# Patient Record
Sex: FEMALE | Race: WHITE | NOT HISPANIC OR LATINO
[De-identification: names, ages, dates, MRNs, and addresses within clinical notes are randomized per-mention and may not be internally consistent; named-entity substitution may affect disease eponyms.]

---

## 2019-10-15 ENCOUNTER — RX RENEWAL (OUTPATIENT)
Age: 69
End: 2019-10-15

## 2019-10-15 PROBLEM — Z00.00 ENCOUNTER FOR PREVENTIVE HEALTH EXAMINATION: Status: ACTIVE | Noted: 2019-10-15

## 2019-10-15 RX ORDER — MECLIZINE HYDROCHLORIDE 25 MG/1
25 TABLET ORAL
Qty: 180 | Refills: 1 | Status: ACTIVE | COMMUNITY
Start: 2019-10-15 | End: 1900-01-01

## 2020-09-21 ENCOUNTER — APPOINTMENT (OUTPATIENT)
Dept: OTOLARYNGOLOGY | Facility: CLINIC | Age: 70
End: 2020-09-21

## 2020-09-25 ENCOUNTER — APPOINTMENT (OUTPATIENT)
Dept: OTOLARYNGOLOGY | Facility: CLINIC | Age: 70
End: 2020-09-25
Payer: SELF-PAY

## 2020-09-25 PROCEDURE — 92592: CPT | Mod: NC

## 2020-10-09 ENCOUNTER — APPOINTMENT (OUTPATIENT)
Dept: OTOLARYNGOLOGY | Facility: CLINIC | Age: 70
End: 2020-10-09
Payer: SELF-PAY

## 2020-10-09 PROCEDURE — 92593: CPT | Mod: NC

## 2020-10-09 PROCEDURE — V5014: CPT

## 2020-10-23 ENCOUNTER — APPOINTMENT (OUTPATIENT)
Dept: OTOLARYNGOLOGY | Facility: CLINIC | Age: 70
End: 2020-10-23
Payer: SELF-PAY

## 2020-10-23 DIAGNOSIS — Z46.1 ENCOUNTER FOR FITTING AND ADJUSTMENT OF HEARING AID: ICD-10-CM

## 2020-10-23 PROCEDURE — 92593: CPT | Mod: NC

## 2021-08-06 ENCOUNTER — APPOINTMENT (OUTPATIENT)
Dept: UROLOGY | Facility: CLINIC | Age: 71
End: 2021-08-06
Payer: COMMERCIAL

## 2021-08-06 VITALS
TEMPERATURE: 97.8 F | DIASTOLIC BLOOD PRESSURE: 80 MMHG | HEIGHT: 63 IN | HEART RATE: 78 BPM | BODY MASS INDEX: 20.91 KG/M2 | SYSTOLIC BLOOD PRESSURE: 145 MMHG | WEIGHT: 118 LBS

## 2021-08-06 PROCEDURE — 99203 OFFICE O/P NEW LOW 30 MIN: CPT

## 2021-08-06 NOTE — HISTORY OF PRESENT ILLNESS
[FreeTextEntry1] : 69 YO F seen TODAY 8/6/2021 as NPT. She comes for interval FU of a previously treated TCC bladder. She reports normal voiding with no dysuria or gross hematuria. No interval symptomatic UTIs.\par \par Patient seen 2/20/2018 for annual BTFU cystoscopy for low grade superficial TCC removed 7/1/2010. FISH negative at that time.\par \par UA trace RBCs, trace WBCs.\par \par PAtient remains under care for her TN and Rheumatoid Arthritis and is now in IV Rx for the latter. she is allergic to Sulfa drugs.\par \par  The patient denies fevers, chills, nausea and or vomiting and no unexplained weight loss. \par All pertinent parts of the patient PFSH (past medical, family and social histories), laboratory, radiological studies and physician notes were reviewed prior to starting the face to face portion of the  visit. Questionnaire results were discussed with patient.\par

## 2021-08-06 NOTE — ASSESSMENT
[FreeTextEntry1] : We discussed interval reassessment for her previous bladder cancer and I sent urine for culture and cytology and FISH test today.  I recommended we wait for the results of the culture before proceeding to cystoscopy in view of the white blood cells found in the urinalysis today.  Reschedule cystoscopy for next week tentatively. Swati Arciniega MD\par

## 2021-08-06 NOTE — LETTER BODY
[Dear  ___] : Dear  [unfilled], [Consult Letter:] : I had the pleasure of evaluating your patient, [unfilled]. [Please see my note below.] : Please see my note below. [Consult Closing:] : Thank you very much for allowing me to participate in the care of this patient.  If you have any questions, please do not hesitate to contact me. [FreeTextEntry3] : Best Regards, \par \par Swati Arciniega MD\par

## 2021-08-06 NOTE — PHYSICAL EXAM
[General Appearance - Well Developed] : well developed [Normal Appearance] : normal appearance [General Appearance - Well Nourished] : well nourished [Well Groomed] : well groomed [General Appearance - In No Acute Distress] : no acute distress [Edema] : no peripheral edema [] : no respiratory distress [Abdomen Soft] : soft [Abdomen Tenderness] : non-tender [Costovertebral Angle Tenderness] : no ~M costovertebral angle tenderness [Normal Station and Gait] : the gait and station were normal for the patient's age [Skin Turgor] : supple [No Focal Deficits] : no focal deficits [Oriented To Time, Place, And Person] : oriented to person, place, and time [Affect] : the affect was normal [Mood] : the mood was normal [Not Anxious] : not anxious

## 2021-08-09 LAB — BACTERIA UR CULT: NORMAL

## 2021-08-11 ENCOUNTER — NON-APPOINTMENT (OUTPATIENT)
Age: 71
End: 2021-08-11

## 2021-08-11 LAB
HLX UV FISH FINAL REPORT: NORMAL
URINE CYTOLOGY: NORMAL

## 2021-08-18 ENCOUNTER — APPOINTMENT (OUTPATIENT)
Dept: UROLOGY | Facility: CLINIC | Age: 71
End: 2021-08-18

## 2021-08-18 ENCOUNTER — APPOINTMENT (OUTPATIENT)
Dept: UROLOGY | Facility: CLINIC | Age: 71
End: 2021-08-18
Payer: COMMERCIAL

## 2021-08-18 VITALS
SYSTOLIC BLOOD PRESSURE: 131 MMHG | OXYGEN SATURATION: 100 % | HEART RATE: 80 BPM | DIASTOLIC BLOOD PRESSURE: 79 MMHG | TEMPERATURE: 97.3 F

## 2021-08-18 PROCEDURE — 99213 OFFICE O/P EST LOW 20 MIN: CPT | Mod: 25

## 2021-08-18 PROCEDURE — 52000 CYSTOURETHROSCOPY: CPT

## 2021-08-24 ENCOUNTER — NON-APPOINTMENT (OUTPATIENT)
Age: 71
End: 2021-08-24

## 2021-09-01 ENCOUNTER — OUTPATIENT (OUTPATIENT)
Dept: OUTPATIENT SERVICES | Facility: HOSPITAL | Age: 71
LOS: 1 days | End: 2021-09-01
Payer: COMMERCIAL

## 2021-09-01 ENCOUNTER — APPOINTMENT (OUTPATIENT)
Dept: CT IMAGING | Facility: HOSPITAL | Age: 71
End: 2021-09-01
Payer: COMMERCIAL

## 2021-09-01 PROCEDURE — 74178 CT ABD&PLV WO CNTR FLWD CNTR: CPT

## 2021-09-01 PROCEDURE — 74178 CT ABD&PLV WO CNTR FLWD CNTR: CPT | Mod: 26,MH

## 2021-09-02 ENCOUNTER — TRANSCRIPTION ENCOUNTER (OUTPATIENT)
Age: 71
End: 2021-09-02

## 2021-09-02 LAB — SARS-COV-2 N GENE NPH QL NAA+PROBE: NOT DETECTED

## 2021-09-03 ENCOUNTER — OUTPATIENT (OUTPATIENT)
Dept: OUTPATIENT SERVICES | Facility: HOSPITAL | Age: 71
LOS: 1 days | Discharge: HOME | End: 2021-09-03
Payer: COMMERCIAL

## 2021-09-03 ENCOUNTER — RESULT REVIEW (OUTPATIENT)
Age: 71
End: 2021-09-03

## 2021-09-03 ENCOUNTER — APPOINTMENT (OUTPATIENT)
Dept: UROLOGY | Facility: AMBULATORY SURGERY CENTER | Age: 71
End: 2021-09-03

## 2021-09-03 PROCEDURE — 88305 TISSUE EXAM BY PATHOLOGIST: CPT | Mod: 26

## 2021-09-03 PROCEDURE — 52235 CYSTOSCOPY AND TREATMENT: CPT

## 2021-09-03 RX ORDER — CEPHALEXIN 500 MG
1 CAPSULE ORAL
Qty: 9 | Refills: 0
Start: 2021-09-03 | End: 2021-09-05

## 2021-09-07 LAB — SURGICAL PATHOLOGY STUDY: SIGNIFICANT CHANGE UP

## 2021-09-10 ENCOUNTER — APPOINTMENT (OUTPATIENT)
Dept: UROLOGY | Facility: CLINIC | Age: 71
End: 2021-09-10
Payer: COMMERCIAL

## 2021-09-10 VITALS — DIASTOLIC BLOOD PRESSURE: 85 MMHG | SYSTOLIC BLOOD PRESSURE: 178 MMHG | TEMPERATURE: 97.9 F | HEART RATE: 82 BPM

## 2021-09-10 PROCEDURE — 99214 OFFICE O/P EST MOD 30 MIN: CPT

## 2021-09-10 NOTE — LETTER BODY
[Dear  ___] : Dear  [unfilled], [Courtesy Letter:] : I had the pleasure of seeing your patient, [unfilled], in my office today. [Please see my note below.] : Please see my note below. [Consult Closing:] : Thank you very much for allowing me to participate in the care of this patient.  If you have any questions, please do not hesitate to contact me. [FreeTextEntry3] : Best Regards, \par \par Swati Arciniega MD\par

## 2021-09-10 NOTE — HISTORY OF PRESENT ILLNESS
[FreeTextEntry1] : 69 YO F seen  8/6/2021 as NPT. She comes for interval FU of a previously treated TCC bladder. She reports normal voiding with no dysuria or gross hematuria. No interval symptomatic UTIs.\par \par Patient seen 2/20/2018 for annual BTFU cystoscopy for low grade superficial TCC removed 7/1/2010. FISH negative at that time.\par \par UA trace RBCs, trace WBCs. Urine tests sent and cystoscopy scheduled.\par \par Patient seen  8/18/2021 for cystoscopy.\par urine C+S negative\par urine cytology negative\par FISH negative.\par Cystoscopy revealed recurrence left lateral wall multifocal TCC.\par PAtient underwent TURBT 9/3/2021 at [facility]. PAt superficial, non-invasive TCC low grade with 5% high grade. No muscularis propria noted. \par \par Patient seen TODAY 9/10/2021 for assessment of her post op course, review of pathology ad plan further testing and treatment. She told me that she is voiding well with no gross hematuria but still has mild pelvic discomfort. \par \par Pathology report reviewed which showed non-invasive TCC predominantly low grade with 5% high grade. lamina propria not invaded, no muscularis propria identified, even on deep specimen.\par UA Nod RBC, MOd WBC, Nitrite negative. \par \par Patient remains under care for her HTN and Rheumatoid Arthritis and is now in IV Rx for the latter. she is allergic to Sulfa drugs.\par \par  The patient denies fevers, chills, nausea and or vomiting and no unexplained weight loss. \par All pertinent parts of the patient PFSH (past medical, family and social histories), laboratory, radiological studies and physician notes were reviewed prior to starting the face to face portion of the  visit. Questionnaire results were discussed with patient.\par

## 2021-09-10 NOTE — ASSESSMENT
[FreeTextEntry1] : We discussed the results of the pathology report in detail including the differences between invasive and noninvasive bladder cancer we also discussed the low grade and high-grade bladder tumors in general and in regard to her pathology report.  I recommended that we proceed with a recystoscopy biopsy fulguration and mitomycin-C instillation 6 weeks after the initial procedure.  We discussed the indications risks alternatives and chances for success of this procedure and she consented procedure was scheduled for October 15.  I also recommended that we plan to follow-up approximately 4 weeks after that procedure with instillation of BCG full course of 6 weekly instillations followed by installations every 3 months and interval cystoscopy and possible biopsy on a 3-month basis for the next 2 years.  We discussed the risks alternatives chance for success and indications of BCG therapy especially in treating superficial high-grade TCC of the bladder.  All patient's questions were answered in detail.

## 2021-09-13 LAB — BACTERIA UR CULT: NORMAL

## 2021-09-15 ENCOUNTER — RESULT CHARGE (OUTPATIENT)
Age: 71
End: 2021-09-15

## 2021-09-15 LAB
BILIRUB UR QL STRIP: NORMAL
CLARITY UR: CLEAR
COLLECTION METHOD: NORMAL
GLUCOSE UR-MCNC: NORMAL
HCG UR QL: 1 EU/DL
HGB UR QL STRIP.AUTO: NORMAL
KETONES UR-MCNC: NORMAL
LEUKOCYTE ESTERASE UR QL STRIP: NORMAL
NITRITE UR QL STRIP: NORMAL
PH UR STRIP: 8
PROT UR STRIP-MCNC: 30
SP GR UR STRIP: 1.01

## 2021-09-16 ENCOUNTER — RESULT CHARGE (OUTPATIENT)
Age: 71
End: 2021-09-16

## 2021-09-16 RX ORDER — PHENAZOPYRIDINE 200 MG/1
200 TABLET, FILM COATED ORAL 3 TIMES DAILY
Qty: 9 | Refills: 0 | Status: ACTIVE | COMMUNITY
Start: 2021-09-16 | End: 1900-01-01

## 2021-09-24 RX ORDER — AMPICILLIN 500 MG/1
500 CAPSULE ORAL
Qty: 14 | Refills: 0 | Status: ACTIVE | COMMUNITY
Start: 2021-09-24 | End: 1900-01-01

## 2021-11-01 LAB — SARS-COV-2 N GENE NPH QL NAA+PROBE: NOT DETECTED

## 2021-11-02 ENCOUNTER — TRANSCRIPTION ENCOUNTER (OUTPATIENT)
Age: 71
End: 2021-11-02

## 2021-11-03 ENCOUNTER — RESULT REVIEW (OUTPATIENT)
Age: 71
End: 2021-11-03

## 2021-11-03 ENCOUNTER — OUTPATIENT (OUTPATIENT)
Dept: OUTPATIENT SERVICES | Facility: HOSPITAL | Age: 71
LOS: 1 days | Discharge: HOME | End: 2021-11-03
Payer: COMMERCIAL

## 2021-11-03 ENCOUNTER — APPOINTMENT (OUTPATIENT)
Dept: UROLOGY | Facility: AMBULATORY SURGERY CENTER | Age: 71
End: 2021-11-03

## 2021-11-03 PROCEDURE — 51720 TREATMENT OF BLADDER LESION: CPT | Mod: 59

## 2021-11-03 PROCEDURE — 52214 CYSTOSCOPY AND TREATMENT: CPT

## 2021-11-03 PROCEDURE — 88305 TISSUE EXAM BY PATHOLOGIST: CPT | Mod: 26

## 2021-11-04 ENCOUNTER — RESULT REVIEW (OUTPATIENT)
Age: 71
End: 2021-11-04

## 2021-11-04 LAB
CULTURE RESULTS: NO GROWTH — SIGNIFICANT CHANGE UP
SPECIMEN SOURCE: SIGNIFICANT CHANGE UP
SURGICAL PATHOLOGY STUDY: SIGNIFICANT CHANGE UP

## 2021-11-04 PROCEDURE — 88112 CYTOPATH CELL ENHANCE TECH: CPT | Mod: 26

## 2021-11-05 LAB — NON-GYNECOLOGICAL CYTOLOGY STUDY: SIGNIFICANT CHANGE UP

## 2021-11-07 ENCOUNTER — EMERGENCY (EMERGENCY)
Facility: HOSPITAL | Age: 71
LOS: 1 days | Discharge: HOME | End: 2021-11-07
Attending: EMERGENCY MEDICINE | Admitting: EMERGENCY MEDICINE
Payer: COMMERCIAL

## 2021-11-07 VITALS
WEIGHT: 115.96 LBS | RESPIRATION RATE: 18 BRPM | HEART RATE: 87 BPM | TEMPERATURE: 98 F | OXYGEN SATURATION: 97 % | SYSTOLIC BLOOD PRESSURE: 148 MMHG | DIASTOLIC BLOOD PRESSURE: 73 MMHG

## 2021-11-07 VITALS
HEART RATE: 80 BPM | DIASTOLIC BLOOD PRESSURE: 72 MMHG | OXYGEN SATURATION: 98 % | RESPIRATION RATE: 18 BRPM | SYSTOLIC BLOOD PRESSURE: 142 MMHG

## 2021-11-07 DIAGNOSIS — Z86.69 PERSONAL HISTORY OF OTHER DISEASES OF THE NERVOUS SYSTEM AND SENSE ORGANS: ICD-10-CM

## 2021-11-07 DIAGNOSIS — Z98.890 OTHER SPECIFIED POSTPROCEDURAL STATES: Chronic | ICD-10-CM

## 2021-11-07 DIAGNOSIS — R31.9 HEMATURIA, UNSPECIFIED: ICD-10-CM

## 2021-11-07 DIAGNOSIS — M19.90 UNSPECIFIED OSTEOARTHRITIS, UNSPECIFIED SITE: ICD-10-CM

## 2021-11-07 DIAGNOSIS — Z87.39 PERSONAL HISTORY OF OTHER DISEASES OF THE MUSCULOSKELETAL SYSTEM AND CONNECTIVE TISSUE: ICD-10-CM

## 2021-11-07 DIAGNOSIS — Z88.0 ALLERGY STATUS TO PENICILLIN: ICD-10-CM

## 2021-11-07 DIAGNOSIS — Z87.448 PERSONAL HISTORY OF OTHER DISEASES OF URINARY SYSTEM: ICD-10-CM

## 2021-11-07 DIAGNOSIS — R10.30 LOWER ABDOMINAL PAIN, UNSPECIFIED: ICD-10-CM

## 2021-11-07 DIAGNOSIS — N39.0 URINARY TRACT INFECTION, SITE NOT SPECIFIED: ICD-10-CM

## 2021-11-07 DIAGNOSIS — Z96.652 PRESENCE OF LEFT ARTIFICIAL KNEE JOINT: ICD-10-CM

## 2021-11-07 DIAGNOSIS — Z88.2 ALLERGY STATUS TO SULFONAMIDES: ICD-10-CM

## 2021-11-07 DIAGNOSIS — Z90.6 ACQUIRED ABSENCE OF OTHER PARTS OF URINARY TRACT: ICD-10-CM

## 2021-11-07 LAB
ALBUMIN SERPL ELPH-MCNC: 4.3 G/DL — SIGNIFICANT CHANGE UP (ref 3.3–5)
ALP SERPL-CCNC: 72 U/L — SIGNIFICANT CHANGE UP (ref 40–120)
ALT FLD-CCNC: 12 U/L — SIGNIFICANT CHANGE UP (ref 10–45)
ANION GAP SERPL CALC-SCNC: 8 MMOL/L — SIGNIFICANT CHANGE UP (ref 5–17)
APPEARANCE UR: ABNORMAL
AST SERPL-CCNC: 20 U/L — SIGNIFICANT CHANGE UP (ref 10–40)
BACTERIA # UR AUTO: PRESENT /HPF
BASOPHILS # BLD AUTO: 0.03 K/UL — SIGNIFICANT CHANGE UP (ref 0–0.2)
BASOPHILS NFR BLD AUTO: 0.4 % — SIGNIFICANT CHANGE UP (ref 0–2)
BILIRUB SERPL-MCNC: 0.5 MG/DL — SIGNIFICANT CHANGE UP (ref 0.2–1.2)
BILIRUB UR-MCNC: ABNORMAL
BUN SERPL-MCNC: 15 MG/DL — SIGNIFICANT CHANGE UP (ref 7–23)
CALCIUM SERPL-MCNC: 9.9 MG/DL — SIGNIFICANT CHANGE UP (ref 8.4–10.5)
CHLORIDE SERPL-SCNC: 94 MMOL/L — LOW (ref 96–108)
CO2 SERPL-SCNC: 31 MMOL/L — SIGNIFICANT CHANGE UP (ref 22–31)
COLOR SPEC: ABNORMAL
COMMENT - URINE: SIGNIFICANT CHANGE UP
CREAT SERPL-MCNC: 0.97 MG/DL — SIGNIFICANT CHANGE UP (ref 0.5–1.3)
DIFF PNL FLD: ABNORMAL
EOSINOPHIL # BLD AUTO: 0.28 K/UL — SIGNIFICANT CHANGE UP (ref 0–0.5)
EOSINOPHIL NFR BLD AUTO: 4 % — SIGNIFICANT CHANGE UP (ref 0–6)
EPI CELLS # UR: SIGNIFICANT CHANGE UP /HPF (ref 0–5)
GLUCOSE SERPL-MCNC: 157 MG/DL — HIGH (ref 70–99)
GLUCOSE UR QL: NEGATIVE — SIGNIFICANT CHANGE UP
HCT VFR BLD CALC: 40.4 % — SIGNIFICANT CHANGE UP (ref 34.5–45)
HGB BLD-MCNC: 13.8 G/DL — SIGNIFICANT CHANGE UP (ref 11.5–15.5)
HYALINE CASTS # UR AUTO: SIGNIFICANT CHANGE UP /LPF (ref 0–2)
IMM GRANULOCYTES NFR BLD AUTO: 0.3 % — SIGNIFICANT CHANGE UP (ref 0–1.5)
KETONES UR-MCNC: 15 MG/DL
LEUKOCYTE ESTERASE UR-ACNC: ABNORMAL
LYMPHOCYTES # BLD AUTO: 1.52 K/UL — SIGNIFICANT CHANGE UP (ref 1–3.3)
LYMPHOCYTES # BLD AUTO: 21.8 % — SIGNIFICANT CHANGE UP (ref 13–44)
MCHC RBC-ENTMCNC: 34.2 GM/DL — SIGNIFICANT CHANGE UP (ref 32–36)
MCHC RBC-ENTMCNC: 34.8 PG — HIGH (ref 27–34)
MCV RBC AUTO: 102 FL — HIGH (ref 80–100)
MONOCYTES # BLD AUTO: 1.05 K/UL — HIGH (ref 0–0.9)
MONOCYTES NFR BLD AUTO: 15 % — HIGH (ref 2–14)
NEUTROPHILS # BLD AUTO: 4.08 K/UL — SIGNIFICANT CHANGE UP (ref 1.8–7.4)
NEUTROPHILS NFR BLD AUTO: 58.5 % — SIGNIFICANT CHANGE UP (ref 43–77)
NITRITE UR-MCNC: POSITIVE
NRBC # BLD: 0 /100 WBCS — SIGNIFICANT CHANGE UP (ref 0–0)
PH UR: 5.5 — SIGNIFICANT CHANGE UP (ref 5–8)
PLATELET # BLD AUTO: 339 K/UL — SIGNIFICANT CHANGE UP (ref 150–400)
POTASSIUM SERPL-MCNC: 3.7 MMOL/L — SIGNIFICANT CHANGE UP (ref 3.5–5.3)
POTASSIUM SERPL-SCNC: 3.7 MMOL/L — SIGNIFICANT CHANGE UP (ref 3.5–5.3)
PROT SERPL-MCNC: 6.9 G/DL — SIGNIFICANT CHANGE UP (ref 6–8.3)
PROT UR-MCNC: 100 MG/DL
RBC # BLD: 3.96 M/UL — SIGNIFICANT CHANGE UP (ref 3.8–5.2)
RBC # FLD: 14.4 % — SIGNIFICANT CHANGE UP (ref 10.3–14.5)
RBC CASTS # UR COMP ASSIST: > 10 /HPF
SODIUM SERPL-SCNC: 133 MMOL/L — LOW (ref 135–145)
SP GR SPEC: 1.02 — SIGNIFICANT CHANGE UP (ref 1–1.03)
UROBILINOGEN FLD QL: 0.2 E.U./DL — SIGNIFICANT CHANGE UP
WBC # BLD: 6.98 K/UL — SIGNIFICANT CHANGE UP (ref 3.8–10.5)
WBC # FLD AUTO: 6.98 K/UL — SIGNIFICANT CHANGE UP (ref 3.8–10.5)
WBC UR QL: ABNORMAL /HPF

## 2021-11-07 PROCEDURE — 36415 COLL VENOUS BLD VENIPUNCTURE: CPT

## 2021-11-07 PROCEDURE — 99284 EMERGENCY DEPT VISIT MOD MDM: CPT | Mod: 25

## 2021-11-07 PROCEDURE — 80053 COMPREHEN METABOLIC PANEL: CPT

## 2021-11-07 PROCEDURE — 85025 COMPLETE CBC W/AUTO DIFF WBC: CPT

## 2021-11-07 PROCEDURE — 74176 CT ABD & PELVIS W/O CONTRAST: CPT | Mod: 26,MC

## 2021-11-07 PROCEDURE — 87086 URINE CULTURE/COLONY COUNT: CPT

## 2021-11-07 PROCEDURE — 74176 CT ABD & PELVIS W/O CONTRAST: CPT | Mod: MC

## 2021-11-07 PROCEDURE — 99285 EMERGENCY DEPT VISIT HI MDM: CPT

## 2021-11-07 PROCEDURE — 81001 URINALYSIS AUTO W/SCOPE: CPT

## 2021-11-07 PROCEDURE — 99282 EMERGENCY DEPT VISIT SF MDM: CPT

## 2021-11-07 PROCEDURE — 96374 THER/PROPH/DIAG INJ IV PUSH: CPT

## 2021-11-07 RX ORDER — CEFTRIAXONE 500 MG/1
1000 INJECTION, POWDER, FOR SOLUTION INTRAMUSCULAR; INTRAVENOUS ONCE
Refills: 0 | Status: COMPLETED | OUTPATIENT
Start: 2021-11-07 | End: 2021-11-07

## 2021-11-07 RX ORDER — ACETAMINOPHEN 500 MG
1000 TABLET ORAL ONCE
Refills: 0 | Status: COMPLETED | OUTPATIENT
Start: 2021-11-07 | End: 2021-11-07

## 2021-11-07 RX ORDER — CEFPODOXIME PROXETIL 100 MG
1 TABLET ORAL
Qty: 14 | Refills: 0
Start: 2021-11-07 | End: 2021-11-13

## 2021-11-07 RX ADMIN — CEFTRIAXONE 100 MILLIGRAM(S): 500 INJECTION, POWDER, FOR SOLUTION INTRAMUSCULAR; INTRAVENOUS at 17:23

## 2021-11-07 RX ADMIN — Medication 1000 MILLIGRAM(S): at 14:33

## 2021-11-07 NOTE — ED PROVIDER NOTE - PATIENT PORTAL LINK FT
You can access the FollowMyHealth Patient Portal offered by [facility] by registering at the following website: [email or web address]. By joining [employer]’s FollowMyHealth portal, you will also be able to view your health information using other applications (apps) compatible with our system.

## 2021-11-07 NOTE — ED PROVIDER NOTE - CLINICAL SUMMARY MEDICAL DECISION MAKING FREE TEXT BOX
71 y/o F with PMHx of bladder ca s/p removal on November 3rd, now with suprapubic pain to lower back pain and hematuria. Dr. Arciniega advised patient to present to ED. On exam patient is well appearing, mild suprapubic tenderness present. Will check labs and urology aware, requesting CT abdomen and pelvis cystogram. 71 y/o F with PMHx of bladder ca s/p removal on November 3rd, now with suprapubic pain to lower back pain and hematuria. Dr. Arciniega advised patient to present to ED. On exam patient is well appearing, mild suprapubic tenderness present. Will check labs and urology aware, requesting CT abdomen and pelvis cystogram.    Findings c/w uti - no other concerning findings on ct.  iv ceftriaxone in ed and cefpodoxime for home.   Follow up with urology this week.

## 2021-11-07 NOTE — ED ADULT NURSE NOTE - OBJECTIVE STATEMENT
A&OX4. AMBULATORY. RED TINGED URINE NOTED TO ROQUE BAG. SKIN WARM AND DRY. BREATHING EVEN AND UNLABORED.

## 2021-11-07 NOTE — CONSULT NOTE ADULT - PROBLEM SELECTOR RECOMMENDATION 9
-stable  -ceftriaxone x 1 in ER  -home with shankar/leg bag and 5 days antibx  -f/u next week with Dr. Arciniega for TOV  -continue present home medications.

## 2021-11-07 NOTE — CONSULT NOTE ADULT - SUBJECTIVE AND OBJECTIVE BOX
HPI: 71 yo female with h/o bladder Ca- s/p TURBT 10/3/2021. Patient sent home with shankar. Yesterday patient noted lower abdominal discomfort in bladder area. This morning noted urine looked bloody and started to have pain in lower back. Patient was told by urologist to come to ER for further evaluation. No fever/chills.       PAST MEDICAL & SURGICAL HISTORY:  Bladder cancer    Hypertension    Meniere&#x27;s disease    Osteoarthritis    Rheumatoid arthritis    History of bladder surgery    H/O left knee surgery        MEDICATIONS  (STANDING):    MEDICATIONS  (PRN):      Allergies    sulbactam (Hives)  sulfa drugs (Unknown)    Intolerances        SOCIAL HISTORY:    FAMILY HISTORY:      Vital Signs Last 24 Hrs  T(C): 36.6 (2021 13:49), Max: 36.6 (2021 13:49)  T(F): 97.9 (2021 13:49), Max: 97.9 (2021 13:49)  HR: 87 (2021 13:49) (87 - 87)  BP: 148/73 (2021 13:49) (148/73 - 148/73)  BP(mean): --  RR: 18 (2021 13:49) (18 - 18)  SpO2: 97% (2021 13:49) (97% - 97%)    On PE:  General: alert and awake  Abdomen: soft, tender SP area, no guarding    : SP discomfort. No CVAT. FC/leg bag intact, urine light [place].    EXT: no edema    LABS:                        13.8   6.98  )-----------( 339      ( 2021 14:43 )             40.4     11-07    133<L>  |  94<L>  |  15  ----------------------------<  157<H>  3.7   |  31  |  0.97    Ca    9.9      2021 14:43    TPro  6.9  /  Alb  4.3  /  TBili  0.5  /  DBili  x   /  AST  20  /  ALT  12  /  AlkPhos  72  11-07      Urinalysis Basic - ( 2021 14:43 )    Color: Orange / Appearance: SL Cloudy / S.020 / pH: x  Gluc: x / Ketone: 15 mg/dL  / Bili: Small / Urobili: 0.2 E.U./dL   Blood: x / Protein: 100 mg/dL / Nitrite: POSITIVE   Leuk Esterase: Small / RBC: > 10 /HPF / WBC 5-10 /HPF   Sq Epi: x / Non Sq Epi: 0-5 /HPF / Bacteria: Present /HPF        RADIOLOGY & ADDITIONAL STUDIES: < from: CT Abdomen and Pelvis Cystogram w/ Catheter (21 @ 16:01) >    Impression: 1. Since 2021, there is no extravasation of contrast from the bladder.    2. Previously seen bladder mass is no longer present.    3. Circumferential bladder wall thickening. This could represent cystitis. Recommend correlation with urinalysis.          < end of copied text >

## 2021-11-07 NOTE — ED PROVIDER NOTE - NSICDXPASTMEDICALHX_GEN_ALL_CORE_FT
PAST MEDICAL HISTORY:  Bladder cancer     Hypertension     Meniere's disease     Osteoarthritis     Rheumatoid arthritis

## 2021-11-07 NOTE — ED ADULT TRIAGE NOTE - CHIEF COMPLAINT QUOTE
" I had a biopsy on wednesday for bladder cancer and since yesterday I have blood in my shankar bag and having back and abdominal pain".

## 2021-11-07 NOTE — ED PROVIDER NOTE - OBJECTIVE STATEMENT
71 y/o F with PMHx of RA osteoarthritis, RSD on methotrexate and with hx of bladder cancer. Patient had procedure on September 3rd and with 2nd procedure November 3rd where cancer was removed, discharged with shankar. Last night experienced suprapubic abdominal pain, radiating to back and noticed urine with slight pink tinge. No clots, urinating and has been replacing shankar bags. Called Dr. Arciniega, her urologist and told to come to the ED. Denies fever, chills, nausea, vomiting.

## 2021-11-07 NOTE — ED PROVIDER NOTE - NS ED MD DISPO DISCHARGE
Has Your Skin Lesion Been Treated?: not been treated Is This A New Presentation, Or A Follow-Up?: Skin Lesions Home

## 2021-11-07 NOTE — ED ADULT NURSE NOTE - DATE OF LAST VACCINATION
Outgoing call to [place]. Left message to call back and confirm surgery date and times.   Date: 11/14/18  Arrival Time: 5:15 am  Surgery Time: 7:15 am 07-Aug-2021

## 2021-11-07 NOTE — ED PROVIDER NOTE - NSFOLLOWUPINSTRUCTIONS_ED_ALL_ED_FT
Take antibiotics as prescribed.    Follow up with Dr. Arciniega this week.    Return to ED with worsening symptoms or other concerns.          Urinary Tract Infection in Women    WHAT YOU NEED TO KNOW:    A urinary tract infection (UTI) is caused by bacteria that get inside your urinary tract. Most bacteria that enter your urinary tract come out when you urinate. If the bacteria stay in your urinary tract, you may get an infection. Your urinary tract includes your kidneys, ureters, bladder, and urethra. Urine is made in your kidneys, and it flows from the ureters to the bladder. Urine leaves the bladder through the urethra. A UTI is more common in your lower urinary tract, which includes your bladder and urethra.     Female Urinary System         DISCHARGE INSTRUCTIONS:    Seek care immediately if:   •You are urinating very little or not at all.      •You have a high fever with shaking chills.       •You have side or back pain that gets worse.      Call your doctor if:   •You have a fever.      •You do not feel better after 2 days of taking antibiotics.      •You are vomiting.       •You have questions or concerns about your condition or care.       Medicines:   •Antibiotics help fight a bacterial infection. If you have UTIs often (called recurrent UTIs), you may be given antibiotics to take regularly. You will be given directions for when and how to use antibiotics. The goal is to prevent UTIs but not cause antibiotic resistance by using antibiotics too often.      •Medicines may be given to decrease pain and burning when you urinate. They will also help decrease the feeling that you need to urinate often. These medicines will make your urine orange or red.      •Take your medicine as directed. Contact your healthcare provider if you think your medicine is not helping or if you have side effects. Tell him or her if you are allergic to any medicine. Keep a list of the medicines, vitamins, and herbs you take. Include the amounts, and when and why you take them. Bring the list or the pill bottles to follow-up visits. Carry your medicine list with you in case of an emergency.      Follow up with your doctor as directed: Write down your questions so you remember to ask them during your visits.     Prevent another UTI:   •Empty your bladder often. Urinate and empty your bladder as soon as you feel the need. Do not hold your urine for long periods of time.      •Wipe from front to back after you urinate or have a bowel movement. This will help prevent germs from getting into your urinary tract through your urethra.      •Drink liquids as directed. Ask how much liquid to drink each day and which liquids are best for you. You may need to drink more liquids than usual to help flush out the bacteria. Do not drink alcohol, caffeine, or citrus juices. These can irritate your bladder and increase your symptoms. Your healthcare provider may recommend cranberry juice to help prevent a UTI.      •Urinate after you have sex. This can help flush out bacteria passed during sex.      •Do not douche or use feminine deodorants. These can change the chemical balance in your vagina.      •Change sanitary pads or tampons often. This will help prevent germs from getting into your urinary tract.       •Talk to your healthcare provider about your birth control method. You may need to change your method if it is increasing your risk for UTIs.      •Wear cotton underwear and clothes that are loose. Tight pants and nylon underwear can trap moisture and cause bacteria to grow.      •Vaginal estrogen may be recommended. This medicine helps prevent UTIs in women who have gone through menopause or are in gabe-menopause.      •Do pelvic muscle exercises often. Pelvic muscle exercises may help you start and stop urinating. Strong pelvic muscles may help you empty your bladder easier. Squeeze these muscles tightly for 5 seconds like you are trying to hold back urine. Then relax for 5 seconds. Gradually work up to squeezing for 10 seconds. Do 3 sets of 15 repetitions a day, or as directed.         © Copyright [employer] 2021           back to top                          © Copyright [employer] 2021

## 2021-11-08 LAB
CULTURE RESULTS: SIGNIFICANT CHANGE UP
SPECIMEN SOURCE: SIGNIFICANT CHANGE UP

## 2021-11-09 ENCOUNTER — NON-APPOINTMENT (OUTPATIENT)
Age: 71
End: 2021-11-09

## 2021-11-10 ENCOUNTER — APPOINTMENT (OUTPATIENT)
Dept: UROLOGY | Facility: CLINIC | Age: 71
End: 2021-11-10
Payer: COMMERCIAL

## 2021-11-10 VITALS
OXYGEN SATURATION: 98 % | HEART RATE: 77 BPM | TEMPERATURE: 98.8 F | SYSTOLIC BLOOD PRESSURE: 130 MMHG | DIASTOLIC BLOOD PRESSURE: 75 MMHG

## 2021-11-10 PROBLEM — H81.09 MENIERE'S DISEASE, UNSPECIFIED EAR: Chronic | Status: ACTIVE | Noted: 2021-11-07

## 2021-11-10 PROBLEM — M06.9 RHEUMATOID ARTHRITIS, UNSPECIFIED: Chronic | Status: ACTIVE | Noted: 2021-11-07

## 2021-11-10 PROBLEM — M19.90 UNSPECIFIED OSTEOARTHRITIS, UNSPECIFIED SITE: Chronic | Status: ACTIVE | Noted: 2021-11-07

## 2021-11-10 PROBLEM — I10 ESSENTIAL (PRIMARY) HYPERTENSION: Chronic | Status: ACTIVE | Noted: 2021-11-07

## 2021-11-10 PROBLEM — C67.9 MALIGNANT NEOPLASM OF BLADDER, UNSPECIFIED: Chronic | Status: ACTIVE | Noted: 2021-11-07

## 2021-11-10 PROCEDURE — 99214 OFFICE O/P EST MOD 30 MIN: CPT

## 2021-11-10 NOTE — LETTER BODY
[Dear  ___] : Dear  [unfilled], [Courtesy Letter:] : I had the pleasure of seeing your patient, [unfilled], in my office today. [Please see my note below.] : Please see my note below. [Consult Closing:] : Thank you very much for allowing me to participate in the care of this patient.  If you have any questions, please do not hesitate to contact me. [FreeTextEntry3] : .cons

## 2021-11-10 NOTE — PHYSICAL EXAM
[General Appearance - Well Developed] : well developed [General Appearance - Well Nourished] : well nourished [Normal Appearance] : normal appearance [Well Groomed] : well groomed [General Appearance - In No Acute Distress] : no acute distress [Abdomen Soft] : soft [Abdomen Tenderness] : non-tender [Costovertebral Angle Tenderness] : no ~M costovertebral angle tenderness [FreeTextEntry1] : Belle indwelling and functioning well. [Oriented To Time, Place, And Person] : oriented to person, place, and time [Affect] : the affect was normal [Mood] : the mood was normal [Not Anxious] : not anxious

## 2021-11-10 NOTE — HISTORY OF PRESENT ILLNESS
[FreeTextEntry1] : 71 YO F seen  8/6/2021 as NPT. She comes for interval FU of a previously treated TCC bladder. She reports normal voiding with no dysuria or gross hematuria. No interval symptomatic UTIs.\par \par Patient seen 2/20/2018 for annual BTFU cystoscopy for low grade superficial TCC removed 7/1/2010. FISH negative at that time.\par \par UA trace RBCs, trace WBCs. Urine tests sent and cystoscopy scheduled.\par \par Patient seen  8/18/2021 for cystoscopy.\par urine C+S negative\par urine cytology negative\par FISH negative.\par Cystoscopy revealed recurrence left lateral wall multifocal TCC.\par PAtient underwent TURBT 9/3/2021 at [place]. PAt superficial, non-invasive TCC low grade with 5% high grade. No muscularis propria noted. \par \par Patient seen  9/10/2021 for assessment of her post op course, review of pathology and plan further testing and treatment. She told me that she is voiding well with no gross hematuria but still has mild pelvic discomfort. \par \par Pathology report reviewed which showed non-invasive TCC predominantly low grade with 5% high grade. lamina propria not invaded, no muscularis propria identified, even on deep specimen.\par UA Nod RBC, MOd WBC, Nitrite negative. \par \par Patient underwent cystoscopy, second look and bladder biopsy of the base of the tumor on 11/3/2021, Tolerated well. Due to deep sampling, the Belle catheter was left indwelling. PATHOLOGY showed no evidence of recurrent cancer, including in muscularis propria.\par \par Patient seen TODAY 11/10/2021 for voiding trial which was successful. SHe has been having pain from the Belle catheter. She is presently on Retuxin for Immunosuppression for her R. A> through Dr. Deandra Palafox [phone]. \par UA RBC\par \par Patient remains under care for her HTN and Rheumatoid Arthritis and is now in IV Rx for the latter. she is allergic to Sulfa drugs.\par \par  The patient denies fevers, chills, nausea and or vomiting and no unexplained weight loss. \par All pertinent parts of the patient PFSH (past medical, family and social histories), laboratory, radiological studies and physician notes were reviewed prior to starting the face to face portion of the  visit. Questionnaire results were discussed with patient.\par

## 2021-11-10 NOTE — ASSESSMENT
[FreeTextEntry1] : I discussed the diagnosis of bladder cancer with high-grade areas in detail with this patient and her  and outlined what I would recommend.  This includes follow-up in 3 weeks to assure that she has healed from the surgery, that the urine is not infected, and that she has no more symptoms after having the Belle catheter removed today.  We will also at that time plan to schedule her to begin BCG installations to total 6 to start approximately 6 to 8 weeks from the date of her surgery.  As this is immunotherapy and she is on immunosuppressive therapy with Rituxan through her rheumatologist Dr. Maurice Palafox, I discussed this plan of treatment with Dr. Gong by phone we are both aware of the complicating issues of these medications which have directly opposite effects and we will augment her treatment plan to accommodate this as best as possible.  After my discussion with Dr. Gong we decided that the treatment of the bladder cancer would take precedent and he will stop the Rituxan infusion moving forward for now.  She received her last infusion in August and he feels that beginning the BCG treatment and December/January would have the same effect for better or for worse if we were to delay it another 1 or 2 or 3 months.  We also discussed some other possible alterations in her treatment for her rheumatoid arthritis.  He recommended that she continue using the methotrexate and he will discuss with her possible prednisone treatment.  We discussed the possibility of miliary TB and we will be vigilant for any signs of that.\par \par Since this conversation took place after the patient left the office, I phoned her to review these results with her and recommended that she contact Dr. Palafox to set up a visit with him to further discuss these options as he had requested. Swati Arciniega MD\par

## 2021-11-12 RX ORDER — METHENAMINE, SODIUM PHOSPHATE, MONOBASIC, METHYLENE BLUE, AND HYOSCYAMINE SULFATE 81.6; 40.8; 10.8; .12 MG/1; MG/1; MG/1; MG/1
81.6 TABLET, COATED ORAL 4 TIMES DAILY
Qty: 120 | Refills: 0 | Status: ACTIVE | COMMUNITY
Start: 2021-11-12 | End: 1900-01-01

## 2021-12-02 ENCOUNTER — APPOINTMENT (OUTPATIENT)
Dept: UROLOGY | Facility: CLINIC | Age: 71
End: 2021-12-02
Payer: COMMERCIAL

## 2021-12-02 VITALS — SYSTOLIC BLOOD PRESSURE: 153 MMHG | HEART RATE: 76 BPM | DIASTOLIC BLOOD PRESSURE: 79 MMHG | TEMPERATURE: 97.7 F

## 2021-12-02 LAB
BILIRUB UR QL STRIP: NORMAL
CLARITY UR: CLEAR
COLLECTION METHOD: NORMAL
GLUCOSE UR-MCNC: NORMAL
HCG UR QL: 0.2 EU/DL
HGB UR QL STRIP.AUTO: NORMAL
KETONES UR-MCNC: NORMAL
LEUKOCYTE ESTERASE UR QL STRIP: NORMAL
NITRITE UR QL STRIP: NORMAL
PH UR STRIP: 7.5
PROT UR STRIP-MCNC: 100
SP GR UR STRIP: 1.01

## 2021-12-02 PROCEDURE — 81003 URINALYSIS AUTO W/O SCOPE: CPT | Mod: QW

## 2021-12-02 PROCEDURE — 99214 OFFICE O/P EST MOD 30 MIN: CPT

## 2021-12-02 NOTE — HISTORY OF PRESENT ILLNESS
[FreeTextEntry1] : 69 YO F seen  8/6/2021 as NPT. She comes for interval FU of a previously treated TCC bladder. She reports normal voiding with no dysuria or gross hematuria. No interval symptomatic UTIs.\par \par Patient seen 2/20/2018 for annual BTFU cystoscopy for low grade superficial TCC removed 7/1/2010. FISH negative at that time.\par \par UA trace RBCs, trace WBCs. Urine tests sent and cystoscopy scheduled.\par \par Patient seen  8/18/2021 for cystoscopy.\par urine C+S negative\par urine cytology negative\par FISH negative.\par Cystoscopy revealed recurrence left lateral wall multifocal TCC.\par PAtient underwent TURBT 9/3/2021 at [facility]. PAt superficial, non-invasive TCC low grade with 5% high grade. No muscularis propria noted. \par \par Patient seen  9/10/2021 for assessment of her post op course, review of pathology and plan further testing and treatment. She told me that she is voiding well with no gross hematuria but still has mild pelvic discomfort. \par \par Pathology report reviewed which showed non-invasive TCC predominantly low grade with 5% high grade. lamina propria not invaded, no muscularis propria identified, even on deep specimen.\par UA Nod RBC, MOd WBC, Nitrite negative. \par \par Patient underwent cystoscopy, second look and bladder biopsy of the base of the tumor on 11/3/2021, Tolerated well. Due to deep sampling, the Belle catheter was left indwelling. PATHOLOGY showed no evidence of recurrent cancer, including in muscularis propria.\par \par Patient seen  11/10/2021 for voiding trial which was successful. SHe has been having pain from the Belle catheter. She is presently on Rituxan for Immunosuppression for her R. A  through Dr. Maurice Palafox [phone]. \par UA RBC\par \par Patient seen TODAY 12/20/2021 for urine check and to plan weekly BCG x 6. She continues to have mild dysuria and urethral stinging which has improved since surgery.\par UA large RBC, WBC, negative nitrites\par \par Patient remains under care for her HTN and Rheumatoid Arthritis and is now in IV Rx for the latter. she is allergic to Sulfa drugs.\par \par  The patient denies fevers, chills, nausea and or vomiting and no unexplained weight loss. \par All pertinent parts of the patient PFSH (past medical, family and social histories), laboratory, radiological studies and physician notes were reviewed prior to starting the face to face portion of the  visit. Questionnaire results were discussed with patient.\par

## 2021-12-02 NOTE — ASSESSMENT
[FreeTextEntry1] : I had an extensive discussion with the patient and her  concerning immunotherapy with BCG especially in regards to her use of Rituxan for her arthritis.  As I discussed with her rheumatologist, treatment of the high-grade bladder cancer supplants treatment of the rheumatoid arthritis.  We will continue with our plans for the immunotherapy using BCG and put the Rituxan therapy on hold for now.  She will forego the scheduled January injection.\par \par We discussed and plans were made today for weekly BCG installations x 6.  We will start these after January 1 2 give her bladder more time to heal from the recent surgeries.  In the meanwhile urine was sent for culture today. \par \par I also discussed the case with PCP Evaristo who felt that the patient has really improved on the Rituxan. We discussed the risks and benefits of the BCG now v. delaying the BCG for another round. We will continue as planned.     Swati Arciniega MD\par

## 2021-12-02 NOTE — LETTER BODY
[Dear  ___] : Dear  [unfilled], [Courtesy Letter:] : I had the pleasure of seeing your patient, [unfilled], in my office today. [Please see my note below.] : Please see my note below. [Consult Closing:] : Thank you very much for allowing me to participate in the care of this patient.  If you have any questions, please do not hesitate to contact me. [DrEddie  ___] : Dr. ROBERTS [FreeTextEntry3] : Best Regards, \par \par Swati Arciniega MD\par

## 2021-12-08 LAB — BACTERIA UR CULT: ABNORMAL

## 2021-12-08 RX ORDER — NITROFURANTOIN MACROCRYSTALS 100 MG/1
100 CAPSULE ORAL
Qty: 14 | Refills: 0 | Status: ACTIVE | COMMUNITY
Start: 2021-12-08 | End: 1900-01-01

## 2021-12-08 RX ORDER — PHENAZOPYRIDINE 200 MG/1
200 TABLET, FILM COATED ORAL 3 TIMES DAILY
Qty: 6 | Refills: 0 | Status: ACTIVE | COMMUNITY
Start: 2021-12-08 | End: 1900-01-01

## 2021-12-22 ENCOUNTER — APPOINTMENT (OUTPATIENT)
Dept: UROLOGY | Facility: CLINIC | Age: 71
End: 2021-12-22
Payer: COMMERCIAL

## 2021-12-22 VITALS
BODY MASS INDEX: 20.02 KG/M2 | DIASTOLIC BLOOD PRESSURE: 79 MMHG | SYSTOLIC BLOOD PRESSURE: 125 MMHG | TEMPERATURE: 97.5 F | HEIGHT: 63 IN | WEIGHT: 113 LBS | HEART RATE: 90 BPM | OXYGEN SATURATION: 97 % | RESPIRATION RATE: 20 BRPM

## 2021-12-22 DIAGNOSIS — N39.0 URINARY TRACT INFECTION, SITE NOT SPECIFIED: ICD-10-CM

## 2021-12-22 LAB
BILIRUB UR QL STRIP: NORMAL
CLARITY UR: CLEAR
COLLECTION METHOD: NORMAL
GLUCOSE UR-MCNC: NORMAL
HCG UR QL: 0.2 EU/DL
HGB UR QL STRIP.AUTO: NORMAL
KETONES UR-MCNC: NORMAL
LEUKOCYTE ESTERASE UR QL STRIP: NORMAL
NITRITE UR QL STRIP: NORMAL
PH UR STRIP: 7
PROT UR STRIP-MCNC: NORMAL
SP GR UR STRIP: 1.02

## 2021-12-22 PROCEDURE — 99214 OFFICE O/P EST MOD 30 MIN: CPT

## 2021-12-22 PROCEDURE — 51798 US URINE CAPACITY MEASURE: CPT

## 2021-12-22 PROCEDURE — 81003 URINALYSIS AUTO W/O SCOPE: CPT | Mod: QW

## 2021-12-22 RX ORDER — PHENAZOPYRIDINE 200 MG/1
200 TABLET, FILM COATED ORAL 3 TIMES DAILY
Qty: 10 | Refills: 0 | Status: ACTIVE | COMMUNITY
Start: 2021-12-22 | End: 1900-01-01

## 2021-12-22 NOTE — ASSESSMENT
[FreeTextEntry1] : 71 year old woman following with Dr. Arciniega for Ta high grade bladder cancer with plan for BCG starting in January, recent UTI 12/21 presents with recurrent UTI symptoms. UA with LE, large blood, nitrite negative. Her symptoms are very bothersome and she would like to start antibiotics right away. Based on prior cultures, I prescribed augmentin x 5 days.\par  - F/U UA, UCx\par  - Augmentin x 5 days\par  - F/U with Dr. Arciniega

## 2021-12-22 NOTE — HISTORY OF PRESENT ILLNESS
[FreeTextEntry1] : 69 YO F seen 8/6/2021 as NPT. She comes for interval FU of a previously treated TCC bladder. She reports normal voiding with no dysuria or gross hematuria. No interval symptomatic UTIs.\par \par Patient seen 2/20/2018 for annual BTFU cystoscopy for low grade superficial TCC removed 7/1/2010. FISH negative at that time.\par \par UA trace RBCs, trace WBCs. Urine tests sent and cystoscopy scheduled.\par \par Patient seen 8/18/2021 for cystoscopy.\par urine C+S negative\par urine cytology negative\par FISH negative.\par Cystoscopy revealed recurrence left lateral wall multifocal TCC.\par PAtient underwent TURBT 9/3/2021 at [facility]. PAt superficial, non-invasive TCC low grade with 5% high grade. No muscularis propria noted. \par \par Patient seen 9/10/2021 for assessment of her post op course, review of pathology and plan further testing and treatment. She told me that she is voiding well with no gross hematuria but still has mild pelvic discomfort. \par \par Pathology report reviewed which showed non-invasive TCC predominantly low grade with 5% high grade. lamina propria not invaded, no muscularis propria identified, even on deep specimen.\par UA Nod RBC, MOd WBC, Nitrite negative. \par \par Patient underwent cystoscopy, second look and bladder biopsy of the base of the tumor on 11/3/2021, Tolerated well. Due to deep sampling, the Belle catheter was left indwelling. PATHOLOGY showed no evidence of recurrent cancer, including in muscularis propria.\par \par Patient seen 11/10/2021 for voiding trial which was successful. SHe has been having pain from the Belle catheter. She is presently on Rituxan for Immunosuppression for her R. A through Dr. Maurice Palafox [phone]. \par UA RBC\par \par 12/2/2021: for urine check and to plan weekly BCG x 6. She continues to have mild dysuria and urethral stinging which has improved since surgery.\par UA large RBC, WBC, negative nitrites\par \par Patient remains under care for her HTN and Rheumatoid Arthritis and is now in IV Rx for the latter. she is allergic to Sulfa drugs.\par \par 12/22/21: Presents with recurrent UTI symptoms. Dysuria, hematuria, frequency and urgency. No fevers, chills, flank pain. UA with large blood, large LE, nitrite negative. PVR 0.

## 2021-12-27 ENCOUNTER — NON-APPOINTMENT (OUTPATIENT)
Age: 71
End: 2021-12-27

## 2021-12-27 LAB
APPEARANCE: ABNORMAL
BACTERIA UR CULT: ABNORMAL
BACTERIA: NEGATIVE
BILIRUBIN URINE: NEGATIVE
BLOOD URINE: ABNORMAL
COLOR: YELLOW
GLUCOSE QUALITATIVE U: NEGATIVE
HYALINE CASTS: 0 /LPF
KETONES URINE: NEGATIVE
LEUKOCYTE ESTERASE URINE: ABNORMAL
MICROSCOPIC-UA: NORMAL
NITRITE URINE: NEGATIVE
PH URINE: 6.5
PROTEIN URINE: ABNORMAL
RED BLOOD CELLS URINE: 479 /HPF
SPECIFIC GRAVITY URINE: 1.01
SQUAMOUS EPITHELIAL CELLS: 0 /HPF
UROBILINOGEN URINE: NORMAL
WHITE BLOOD CELLS URINE: 159 /HPF

## 2021-12-27 RX ORDER — AMOXICILLIN AND CLAVULANATE POTASSIUM 875; 125 MG/1; MG/1
875-125 TABLET, COATED ORAL TWICE DAILY
Qty: 10 | Refills: 0 | Status: ACTIVE | COMMUNITY
Start: 2021-12-22 | End: 1900-01-01

## 2022-01-05 LAB — BACTERIA UR CULT: NORMAL

## 2022-01-06 ENCOUNTER — NON-APPOINTMENT (OUTPATIENT)
Age: 72
End: 2022-01-06

## 2022-01-07 ENCOUNTER — APPOINTMENT (OUTPATIENT)
Dept: UROLOGY | Facility: CLINIC | Age: 72
End: 2022-01-07
Payer: COMMERCIAL

## 2022-01-07 VITALS
SYSTOLIC BLOOD PRESSURE: 132 MMHG | OXYGEN SATURATION: 100 % | HEIGHT: 63 IN | BODY MASS INDEX: 20.2 KG/M2 | WEIGHT: 114 LBS | HEART RATE: 80 BPM | DIASTOLIC BLOOD PRESSURE: 76 MMHG

## 2022-01-07 LAB
BILIRUB UR QL STRIP: NORMAL
CLARITY UR: CLEAR
COLLECTION METHOD: NORMAL
GLUCOSE UR-MCNC: NORMAL
HCG UR QL: 0.2 EU/DL
HGB UR QL STRIP.AUTO: NORMAL
KETONES UR-MCNC: NORMAL
LEUKOCYTE ESTERASE UR QL STRIP: NORMAL
NITRITE UR QL STRIP: NORMAL
PH UR STRIP: 7
PROT UR STRIP-MCNC: NORMAL
SP GR UR STRIP: 1.01

## 2022-01-07 PROCEDURE — 51720 TREATMENT OF BLADDER LESION: CPT

## 2022-01-07 PROCEDURE — 81003 URINALYSIS AUTO W/O SCOPE: CPT | Mod: QW

## 2022-01-07 RX ADMIN — BACILLUS CALMETTE-GUERIN 0 MG: 50 POWDER, FOR SUSPENSION INTRAVESICAL at 00:00

## 2022-01-07 NOTE — ASSESSMENT
[FreeTextEntry1] : BCG #1/6 instilled today and tolerated well. FU 1 week for #2. Patient given instructions (verbal and written). Swati Arciniega MD\par

## 2022-01-07 NOTE — HISTORY OF PRESENT ILLNESS
[FreeTextEntry1] : 70 YO F seen  8/6/2021 as NPT. She comes for interval FU of a previously treated TCC bladder. She reports normal voiding with no dysuria or gross hematuria. No interval symptomatic UTIs.\par \par Patient seen 2/20/2018 for annual BTFU cystoscopy for low grade superficial TCC removed 7/1/2010. FISH negative at that time.\par \par UA trace RBCs, trace WBCs. Urine tests sent and cystoscopy scheduled.\par \par Patient seen  8/18/2021 for cystoscopy.\par urine C+S negative\par urine cytology negative\par FISH negative.\par Cystoscopy revealed recurrence left lateral wall multifocal TCC.\par PAtient underwent TURBT 9/3/2021 at [facility]. PAt superficial, non-invasive TCC low grade with 5% high grade. No muscularis propria noted. \par \par Patient seen  9/10/2021 for assessment of her post op course, review of pathology and plan further testing and treatment. She told me that she is voiding well with no gross hematuria but still has mild pelvic discomfort. \par \par Pathology report reviewed which showed non-invasive TCC predominantly low grade with 5% high grade. lamina propria not invaded, no muscularis propria identified, even on deep specimen.\par UA Nod RBC, MOd WBC, Nitrite negative. \par \par Patient underwent cystoscopy, second look and bladder biopsy of the base of the tumor on 11/3/2021, Tolerated well. Due to deep sampling, the Belle catheter was left indwelling. PATHOLOGY showed no evidence of recurrent cancer, including in muscularis propria.\par \par Patient seen  11/10/2021 for voiding trial which was successful. SHe has been having pain from the Belle catheter. She is presently on Rituxan for Immunosuppression for her R. A  through Dr. Maurice Palafox [phone]. \par UA RBC\par \par Patient seen  12/20/2021 for urine check and to plan weekly BCG x 6. She continues to have mild dysuria and urethral stinging which has improved since surgery.\par UA large RBC, WBC, negative nitrites\par \par Patient seen TODAY 1/7/2022 for 1st of 6 planned BCG instillations. Urine C+S from 1/3/2022 negative.\par UA trace RBC, Large WBC\par Patient remains under care for her HTN and Rheumatoid Arthritis and is now in IV Rx for the latter. she is allergic to Sulfa drugs.\par \par  The patient denies fevers, chills, nausea and or vomiting and no unexplained weight loss. \par All pertinent parts of the patient PFSH (past medical, family and social histories), laboratory, radiological studies and physician notes were reviewed prior to starting the face to face portion of the  visit. Questionnaire results were discussed with patient.\par

## 2022-01-12 RX ORDER — BACILLUS CALMETTE-GUERIN 50 MG/50ML
50 POWDER, FOR SUSPENSION INTRAVESICAL
Qty: 0 | Refills: 0 | Status: COMPLETED | OUTPATIENT
Start: 2022-01-07

## 2022-01-13 ENCOUNTER — APPOINTMENT (OUTPATIENT)
Dept: UROLOGY | Facility: CLINIC | Age: 72
End: 2022-01-13
Payer: COMMERCIAL

## 2022-01-13 VITALS
BODY MASS INDEX: 20.2 KG/M2 | WEIGHT: 114 LBS | TEMPERATURE: 98.3 F | HEIGHT: 63 IN | DIASTOLIC BLOOD PRESSURE: 78 MMHG | HEART RATE: 71 BPM | SYSTOLIC BLOOD PRESSURE: 127 MMHG

## 2022-01-13 LAB
BILIRUB UR QL STRIP: NORMAL
CLARITY UR: CLEAR
COLLECTION METHOD: NORMAL
GLUCOSE UR-MCNC: NORMAL
HCG UR QL: 0.2 EU/DL
HGB UR QL STRIP.AUTO: NORMAL
KETONES UR-MCNC: NORMAL
LEUKOCYTE ESTERASE UR QL STRIP: NORMAL
NITRITE UR QL STRIP: NORMAL
PH UR STRIP: 7.5
PROT UR STRIP-MCNC: NORMAL
SP GR UR STRIP: 1.01

## 2022-01-13 PROCEDURE — 81003 URINALYSIS AUTO W/O SCOPE: CPT | Mod: QW

## 2022-01-13 PROCEDURE — 51720 TREATMENT OF BLADDER LESION: CPT

## 2022-01-13 RX ORDER — BACILLUS CALMETTE-GUERIN 50 MG/50ML
50 POWDER, FOR SUSPENSION INTRAVESICAL
Qty: 0 | Refills: 0 | Status: COMPLETED | OUTPATIENT
Start: 2022-01-13

## 2022-01-13 RX ORDER — BACILLUS CALMETTE-GUERIN 50 MG/50ML
50 POWDER, FOR SUSPENSION INTRAVESICAL
Qty: 1 | Refills: 0 | Status: ACTIVE | COMMUNITY
Start: 2022-01-13 | End: 1900-01-01

## 2022-01-13 RX ADMIN — BACILLUS CALMETTE-GUERIN 0 MG: 50 POWDER, FOR SUSPENSION INTRAVESICAL at 00:00

## 2022-01-13 NOTE — ASSESSMENT
[FreeTextEntry1] : BCG #2/6 instilled, urine C+S sent. FU for #3/6 next week. Swati Arciniega MD\par

## 2022-01-13 NOTE — HISTORY OF PRESENT ILLNESS
[FreeTextEntry1] : 72 YO F seen  8/6/2021 as NPT. She comes for interval FU of a previously treated TCC bladder. She reports normal voiding with no dysuria or gross hematuria. No interval symptomatic UTIs.\par \par Patient seen 2/20/2018 for annual BTFU cystoscopy for low grade superficial TCC removed 7/1/2010. FISH negative at that time.\par \par UA trace RBCs, trace WBCs. Urine tests sent and cystoscopy scheduled.\par \par Patient seen  8/18/2021 for cystoscopy.\par urine C+S negative\par urine cytology negative\par FISH negative.\par Cystoscopy revealed recurrence left lateral wall multifocal TCC.\par PAtient underwent TURBT 9/3/2021 at [facility]. PAt superficial, non-invasive TCC low grade with 5% high grade. No muscularis propria noted. \par \par Patient seen  9/10/2021 for assessment of her post op course, review of pathology and plan further testing and treatment. She told me that she is voiding well with no gross hematuria but still has mild pelvic discomfort. \par \par Pathology report reviewed which showed non-invasive TCC predominantly low grade with 5% high grade. lamina propria not invaded, no muscularis propria identified, even on deep specimen.\par UA Nod RBC, MOd WBC, Nitrite negative. \par \par Patient underwent cystoscopy, second look and bladder biopsy of the base of the tumor on 11/3/2021, Tolerated well. Due to deep sampling, the Belle catheter was left indwelling. PATHOLOGY showed no evidence of recurrent cancer, including in muscularis propria.\par \par Patient seen  11/10/2021 for voiding trial which was successful. SHe has been having pain from the Belle catheter. She is presently on Rituxan for Immunosuppression for her R. A  through Dr. Maurice Palafox [phone]. \par UA RBC\[facility] \par Patient seen  12/20/2021 for urine check and to plan weekly BCG x 6. She continues to have mild dysuria and urethral stinging which has improved since surgery.\par UA large RBC, WBC, negative nitrites\par \par Patient seen  1/7/2022 for 1st of 6 planned BCG instillations. Urine C+S from 1/3/2022 negative.\par UA trace RBC, Large WBC\par \par Patient seen TODAY 1/13/2002 for 2/6 BCG instillations. She tolerated the 1 st dose without incident. She continues to have dysuria intermittently but is more concerned over her RA which keeps becoming more symptomatic.\par UA large WBC only\par \par Patient remains under care for her HTN and Rheumatoid Arthritis and is now in IV Rx for the latter. She is allergic to Sulfa drugs.\par \par  The patient denies fevers, chills, nausea and or vomiting and no unexplained weight loss. \par All pertinent parts of the patient PFSH (past medical, family and social histories), laboratory, radiological studies and physician notes were reviewed prior to starting the face to face portion of the  visit. Questionnaire results were discussed with patient.\par

## 2022-01-20 ENCOUNTER — NON-APPOINTMENT (OUTPATIENT)
Age: 72
End: 2022-01-20

## 2022-01-20 LAB — BACTERIA UR CULT: ABNORMAL

## 2022-01-21 ENCOUNTER — NON-APPOINTMENT (OUTPATIENT)
Age: 72
End: 2022-01-21

## 2022-01-21 ENCOUNTER — APPOINTMENT (OUTPATIENT)
Dept: UROLOGY | Facility: CLINIC | Age: 72
End: 2022-01-21
Payer: COMMERCIAL

## 2022-01-21 VITALS
WEIGHT: 114 LBS | HEIGHT: 63 IN | TEMPERATURE: 97.9 F | HEART RATE: 67 BPM | DIASTOLIC BLOOD PRESSURE: 81 MMHG | BODY MASS INDEX: 20.2 KG/M2 | SYSTOLIC BLOOD PRESSURE: 137 MMHG

## 2022-01-21 LAB
BILIRUB UR QL STRIP: NORMAL
CLARITY UR: NORMAL
COLLECTION METHOD: NORMAL
GLUCOSE UR-MCNC: NORMAL
HCG UR QL: 0.2 EU/DL
HGB UR QL STRIP.AUTO: NORMAL
KETONES UR-MCNC: NORMAL
LEUKOCYTE ESTERASE UR QL STRIP: NORMAL
NITRITE UR QL STRIP: NORMAL
PH UR STRIP: 7
PROT UR STRIP-MCNC: NORMAL
SP GR UR STRIP: 1.01

## 2022-01-21 PROCEDURE — 81003 URINALYSIS AUTO W/O SCOPE: CPT | Mod: QW

## 2022-01-21 PROCEDURE — 51720 TREATMENT OF BLADDER LESION: CPT

## 2022-01-21 RX ORDER — BACILLUS CALMETTE-GUERIN 50 MG/50ML
50 POWDER, FOR SUSPENSION INTRAVESICAL
Qty: 0 | Refills: 0 | Status: COMPLETED | OUTPATIENT
Start: 2022-01-21

## 2022-01-21 RX ADMIN — BACILLUS CALMETTE-GUERIN 0 MG: 50 POWDER, FOR SUSPENSION INTRAVESICAL at 00:00

## 2022-01-21 NOTE — ASSESSMENT
[FreeTextEntry1] : We discussed the pros and cons of continuing with weekly BCG versus treating the Enterococcus bacteria and taking a week off of the BCG treatments.  In view of her state urinary symptoms and significant issues with her rheumatoid arthritis, she opted and I agree with continued weekly BCG treatments to completion of the 6 planned treatments.\par \par In accordance with the above, she received treatment #3 today without issue and will come back in 1 week for #4.  Urine also sent for culture today. Swati Arciniega MD\par

## 2022-01-21 NOTE — HISTORY OF PRESENT ILLNESS
[FreeTextEntry1] : 72 YO F seen  8/6/2021 as NPT. She comes for interval FU of a previously treated TCC bladder. She reports normal voiding with no dysuria or gross hematuria. No interval symptomatic UTIs.\par \par Patient seen 2/20/2018 for annual BTFU cystoscopy for low grade superficial TCC removed 7/1/2010. FISH negative at that time.\par \par UA trace RBCs, trace WBCs. Urine tests sent and cystoscopy scheduled.\par \par Patient seen  8/18/2021 for cystoscopy.\par urine C+S negative\par urine cytology negative\par FISH negative.\par Cystoscopy revealed recurrence left lateral wall multifocal TCC.\par PAtient underwent TURBT 9/3/2021 at [facility]. PAt superficial, non-invasive TCC low grade with 5% high grade. No muscularis propria noted. \par \par Patient seen  9/10/2021 for assessment of her post op course, review of pathology and plan further testing and treatment. She told me that she is voiding well with no gross hematuria but still has mild pelvic discomfort. \par \par Pathology report reviewed which showed non-invasive TCC predominantly low grade with 5% high grade. lamina propria not invaded, no muscularis propria identified, even on deep specimen.\par UA Nod RBC, MOd WBC, Nitrite negative. \par \par Patient underwent cystoscopy, second look and bladder biopsy of the base of the tumor on 11/3/2021, Tolerated well. Due to deep sampling, the Belle catheter was left indwelling. PATHOLOGY showed no evidence of recurrent cancer, including in muscularis propria.\par \par Patient seen  11/10/2021 for voiding trial which was successful. SHe has been having pain from the Belle catheter. She is presently on Rituxan for Immunosuppression for her R. A  through Dr. Maurice Palafox [phone]. \par UA RBC\[facility] \par Patient seen  12/20/2021 for urine check and to plan weekly BCG x 6. She continues to have mild dysuria and urethral stinging which has improved since surgery.\par UA large RBC, WBC, negative nitrites\par \par Patient seen  1/7/2022 for 1st of 6 planned BCG instillations. Urine C+S from 1/3/2022 negative.\par UA trace RBC, Large WBC\par \par Patient seen 1/13/2002 for 2/6 BCG instillations. She tolerated the 1 st dose without incident. She continues to have dysuria intermittently but is more concerned over her RA which keeps becoming more symptomatic.\par UA large WBC only.\par C+S positive >100 K Enterococcus S to all.\par We elected not to treat the urine culture but to continue with the BCG due to her desire to complete the course qnd return on her treatment for her R. A.\par \par Patient seen TODAY 1/21/2022 for BCG #3/6. Her bladder discomfort is unchanged and tolerable, but her R.A. continues to be severe and symptomatic. \par UA small RBC, small WBC only.\par \par Patient remains under care for her HTN and Rheumatoid Arthritis and is now in IV Rx for the latter. She is allergic to Sulfa drugs.\par \par  The patient denies fevers, chills, nausea and or vomiting and no unexplained weight loss. \par All pertinent parts of the patient PFSH (past medical, family and social histories), laboratory, radiological studies and physician notes were reviewed prior to starting the face to face portion of the  visit. Questionnaire results were discussed with patient.\par

## 2022-01-26 LAB — BACTERIA UR CULT: ABNORMAL

## 2022-01-28 ENCOUNTER — APPOINTMENT (OUTPATIENT)
Dept: UROLOGY | Facility: CLINIC | Age: 72
End: 2022-01-28
Payer: COMMERCIAL

## 2022-01-28 VITALS — HEART RATE: 96 BPM | TEMPERATURE: 97.3 F | SYSTOLIC BLOOD PRESSURE: 138 MMHG | DIASTOLIC BLOOD PRESSURE: 78 MMHG

## 2022-01-28 LAB
BILIRUB UR QL STRIP: NORMAL
CLARITY UR: CLEAR
COLLECTION METHOD: NORMAL
GLUCOSE UR-MCNC: NORMAL
HCG UR QL: 0.2 EU/DL
HGB UR QL STRIP.AUTO: NORMAL
KETONES UR-MCNC: NORMAL
LEUKOCYTE ESTERASE UR QL STRIP: NORMAL
NITRITE UR QL STRIP: NORMAL
PH UR STRIP: 8
PROT UR STRIP-MCNC: NORMAL
SP GR UR STRIP: 1.01

## 2022-01-28 PROCEDURE — 81003 URINALYSIS AUTO W/O SCOPE: CPT | Mod: QW

## 2022-01-28 PROCEDURE — 51720 TREATMENT OF BLADDER LESION: CPT

## 2022-01-28 RX ORDER — BACILLUS CALMETTE-GUERIN 50 MG/50ML
50 POWDER, FOR SUSPENSION INTRAVESICAL
Qty: 0 | Refills: 0 | Status: COMPLETED | OUTPATIENT
Start: 2022-01-28

## 2022-01-28 RX ADMIN — BACILLUS CALMETTE-GUERIN 0 MG: 50 POWDER, FOR SUSPENSION INTRAVESICAL at 00:00

## 2022-01-28 NOTE — HISTORY OF PRESENT ILLNESS
[FreeTextEntry1] : 72 YO F seen  8/6/2021 as NPT. She comes for interval FU of a previously treated TCC bladder. She reports normal voiding with no dysuria or gross hematuria. No interval symptomatic UTIs.\par \par Patient seen 2/20/2018 for annual BTFU cystoscopy for low grade superficial TCC removed 7/1/2010. FISH negative at that time.\par \par UA trace RBCs, trace WBCs. Urine tests sent and cystoscopy scheduled.\par \par Patient seen  8/18/2021 for cystoscopy.\par urine C+S negative\par urine cytology negative\par FISH negative.\par Cystoscopy revealed recurrence left lateral wall multifocal TCC.\par PAtient underwent TURBT 9/3/2021 at [facility]. PAt superficial, non-invasive TCC low grade with 5% high grade. No muscularis propria noted. \par \par Patient seen  9/10/2021 for assessment of her post op course, review of pathology and plan further testing and treatment. She told me that she is voiding well with no gross hematuria but still has mild pelvic discomfort. \par \par Pathology report reviewed which showed non-invasive TCC predominantly low grade with 5% high grade. lamina propria not invaded, no muscularis propria identified, even on deep specimen.\par UA Nod RBC, MOd WBC, Nitrite negative. \par \par Patient underwent cystoscopy, second look and bladder biopsy of the base of the tumor on 11/3/2021, Tolerated well. Due to deep sampling, the Belle catheter was left indwelling. PATHOLOGY showed no evidence of recurrent cancer, including in muscularis propria.\par \par Patient seen  11/10/2021 for voiding trial which was successful. SHe has been having pain from the Belle catheter. She is presently on Rituxan for Immunosuppression for her R. A  through Dr. Maurice Palafox [phone]. \par UA RBC\[facility] \par Patient seen  12/20/2021 for urine check and to plan weekly BCG x 6. She continues to have mild dysuria and urethral stinging which has improved since surgery.\par UA large RBC, WBC, negative nitrites\par \par Patient seen  1/7/2022 for 1st of 6 planned BCG instillations. Urine C+S from 1/3/2022 negative.\par UA trace RBC, Large WBC\par \par Patient seen 1/13/2002 for 2/6 BCG instillations. She tolerated the 1 st dose without incident. She continues to have dysuria intermittently but is more concerned over her RA which keeps becoming more symptomatic.\par UA large WBC only.\par C+S positive >100 K Enterococcus S to all.\par We elected not to treat the urine culture but to continue with the BCG due to her desire to complete the course and return on her treatment for her R. A.\par \par Patient seen  1/21/2022 for BCG #3/6. Her bladder discomfort is unchanged and tolerable, but her R.A. continues to be severe and symptomatic. \par UA small RBC, small WBC only.\par C+S 50 - 99 K Enterococcus\par \par Patient seen TODAY 1/28/2022 for BCG # 4/6. She told me that she is tolerating the BCG well with no problems retaining it for 2 hours. Her LUTS have improved. She started on IV Methotrexate with her Rheumatologist this week for her very symptomatic Rheumatoid Arthritis.\par UA No RBC, Trace WBC, Nitrite negative.\par \par Patient remains under care for her HTN and Rheumatoid Arthritis and is now in IV Rx for the latter. She is allergic to Sulfa drugs.\par \par  The patient denies fevers, chills, nausea and or vomiting and no unexplained weight loss. \par All pertinent parts of the patient PFSH (past medical, family and social histories), laboratory, radiological studies and physician notes were reviewed prior to starting the face to face portion of the  visit. Questionnaire results were discussed with patient.\par

## 2022-01-28 NOTE — ASSESSMENT
[FreeTextEntry1] : BCG #4/6 instilled today, tolerated well. FU next week for #5/6. Swati Arciniega MD\par

## 2022-01-28 NOTE — HISTORY OF PRESENT ILLNESS
[FreeTextEntry1] : 72 YO F seen  8/6/2021 as NPT. She comes for interval FU of a previously treated TCC bladder. She reports normal voiding with no dysuria or gross hematuria. No interval symptomatic UTIs.\par \par Patient seen 2/20/2018 for annual BTFU cystoscopy for low grade superficial TCC removed 7/1/2010. FISH negative at that time.\par \par UA trace RBCs, trace WBCs. Urine tests sent and cystoscopy scheduled.\par \par Patient seen  8/18/2021 for cystoscopy.\par urine C+S negative\par urine cytology negative\par FISH negative.\par Cystoscopy revealed recurrence left lateral wall multifocal TCC.\par PAtient underwent TURBT 9/3/2021 at [facility]. PAt superficial, non-invasive TCC low grade with 5% high grade. No muscularis propria noted. \par \par Patient seen  9/10/2021 for assessment of her post op course, review of pathology and plan further testing and treatment. She told me that she is voiding well with no gross hematuria but still has mild pelvic discomfort. \par \par Pathology report reviewed which showed non-invasive TCC predominantly low grade with 5% high grade. lamina propria not invaded, no muscularis propria identified, even on deep specimen.\par UA Nod RBC, MOd WBC, Nitrite negative. \par \par Patient underwent cystoscopy, second look and bladder biopsy of the base of the tumor on 11/3/2021, Tolerated well. Due to deep sampling, the Belle catheter was left indwelling. PATHOLOGY showed no evidence of recurrent cancer, including in muscularis propria.\par \par Patient seen  11/10/2021 for voiding trial which was successful. SHe has been having pain from the Belle catheter. She is presently on Rituxan for Immunosuppression for her R. A  through Dr. Maurice Palafox [phone]. \par UA RBC\[facility] \par Patient seen  12/20/2021 for urine check and to plan weekly BCG x 6. She continues to have mild dysuria and urethral stinging which has improved since surgery.\par UA large RBC, WBC, negative nitrites\par \par Patient seen  1/7/2022 for 1st of 6 planned BCG instillations. Urine C+S from 1/3/2022 negative.\par UA trace RBC, Large WBC\par \par Patient seen 1/13/2002 for 2/6 BCG instillations. She tolerated the 1 st dose without incident. She continues to have dysuria intermittently but is more concerned over her RA which keeps becoming more symptomatic.\par UA large WBC only.\par C+S positive >100 K Enterococcus S to all.\par We elected not to treat the urine culture but to continue with the BCG due to her desire to complete the course and return on her treatment for her R. A.\par \par Patient seen  1/21/2022 for BCG #3/6. Her bladder discomfort is unchanged and tolerable, but her R.A. continues to be severe and symptomatic. \par UA small RBC, small WBC only.\par C+S 50 - 99 K Enterococcus\par \par Patient seen TODAY 1/28/2022 for BCG # 4/6. She told me that she is tolerating the BCG well with no problems retaining it for 2 hours. Her LUTS have improved. She started on IV Methotrexate with her Rheumatologist this week for her very symptomatic Rheumatoid Arthritis.\par UA No RBC, Trace WBC, Nitrite negative.\par \par Patient remains under care for her HTN and Rheumatoid Arthritis and is now in IV Rx for the latter. She is allergic to Sulfa drugs.\par \par  The patient denies fevers, chills, nausea and or vomiting and no unexplained weight loss. \par All pertinent parts of the patient PFSH (past medical, family and social histories), laboratory, radiological studies and physician notes were reviewed prior to starting the face to face portion of the  visit. Questionnaire results were discussed with patient.\par

## 2022-01-31 LAB — BACTERIA UR CULT: NORMAL

## 2022-02-01 ENCOUNTER — NON-APPOINTMENT (OUTPATIENT)
Age: 72
End: 2022-02-01

## 2022-02-04 ENCOUNTER — APPOINTMENT (OUTPATIENT)
Dept: UROLOGY | Facility: CLINIC | Age: 72
End: 2022-02-04
Payer: COMMERCIAL

## 2022-02-04 VITALS — HEART RATE: 73 BPM | SYSTOLIC BLOOD PRESSURE: 116 MMHG | TEMPERATURE: 97.2 F | DIASTOLIC BLOOD PRESSURE: 66 MMHG

## 2022-02-04 PROCEDURE — 51720 TREATMENT OF BLADDER LESION: CPT

## 2022-02-04 PROCEDURE — 81003 URINALYSIS AUTO W/O SCOPE: CPT | Mod: QW

## 2022-02-04 RX ORDER — BACILLUS CALMETTE-GUERIN 50 MG/50ML
50 POWDER, FOR SUSPENSION INTRAVESICAL
Qty: 0 | Refills: 0 | Status: COMPLETED | OUTPATIENT
Start: 2022-02-04

## 2022-02-04 RX ADMIN — BACILLUS CALMETTE-GUERIN 0 MG: 50 POWDER, FOR SUSPENSION INTRAVESICAL at 00:00

## 2022-02-04 NOTE — ASSESSMENT
[FreeTextEntry1] : FU 1 week for #6/6. Cystoscopy 4 weeks later. C+S today.  Swati Arciniega MD\par

## 2022-02-04 NOTE — HISTORY OF PRESENT ILLNESS
[FreeTextEntry1] : 72 YO F seen  8/6/2021 as NPT. She comes for interval FU of a previously treated TCC bladder. She reports normal voiding with no dysuria or gross hematuria. No interval symptomatic UTIs.\par \par Patient seen 2/20/2018 for annual BTFU cystoscopy for low grade superficial TCC removed 7/1/2010. FISH negative at that time.\par \par UA trace RBCs, trace WBCs. Urine tests sent and cystoscopy scheduled.\par \par Patient seen  8/18/2021 for cystoscopy.\par urine C+S negative\par urine cytology negative\par FISH negative.\par Cystoscopy revealed recurrence left lateral wall multifocal TCC.\par PAtient underwent TURBT 9/3/2021 at [facility]. PAt superficial, non-invasive TCC low grade with 5% high grade. No muscularis propria noted. \par \par Patient seen  9/10/2021 for assessment of her post op course, review of pathology and plan further testing and treatment. She told me that she is voiding well with no gross hematuria but still has mild pelvic discomfort. \par \par Pathology report reviewed which showed non-invasive TCC predominantly low grade with 5% high grade. lamina propria not invaded, no muscularis propria identified, even on deep specimen.\par UA Nod RBC, MOd WBC, Nitrite negative. \par \par Patient underwent cystoscopy, second look and bladder biopsy of the base of the tumor on 11/3/2021, Tolerated well. Due to deep sampling, the Belle catheter was left indwelling. PATHOLOGY showed no evidence of recurrent cancer, including in muscularis propria.\par \par Patient seen  11/10/2021 for voiding trial which was successful. SHe has been having pain from the Belle catheter. She is presently on Rituxan for Immunosuppression for her R. A  through Dr. Maurice Palafox [phone]. \par UA RBC\[facility] \par Patient seen  12/20/2021 for urine check and to plan weekly BCG x 6. She continues to have mild dysuria and urethral stinging which has improved since surgery.\par UA large RBC, WBC, negative nitrites\par \par Patient seen  1/7/2022 for 1st of 6 planned BCG instillations. Urine C+S from 1/3/2022 negative.\par UA trace RBC, Large WBC\par \par Patient seen 1/13/2002 for 2/6 BCG instillations. She tolerated the 1 st dose without incident. She continues to have dysuria intermittently but is more concerned over her RA which keeps becoming more symptomatic.\par UA large WBC only.\par C+S positive >100 K Enterococcus S to all.\par We elected not to treat the urine culture but to continue with the BCG due to her desire to complete the course and return on her treatment for her R. A.\par \par Patient seen  1/21/2022 for BCG #3/6. Her bladder discomfort is unchanged and tolerable, but her R.A. continues to be severe and symptomatic. \par UA small RBC, small WBC only.\par C+S 50 - 99 K Enterococcus\par \par Patient seen  1/28/2022 for BCG # 4/6. She told me that she is tolerating the BCG well with no problems retaining it for 2 hours. Her LUTS have improved. She started on IV Methotrexate with her Rheumatologist this week for her very symptomatic Rheumatoid Arthritis.\par UA No RBC, Trace WBC, Nitrite negative.\par \par Patient seen TODAY 2/4/2022 for BCG # 5/6. She tolerated the last dose well with minimal dysuria on day 1. She continues to suffer from her R.A.\par UA negative. BCG #5/6 instilled today.\par \par Patient remains under care for her HTN and Rheumatoid Arthritis and is now in IV Rx for the latter. She is allergic to Sulfa drugs.\par \par  The patient denies fevers, chills, nausea and or vomiting and no unexplained weight loss. \par All pertinent parts of the patient PFSH (past medical, family and social histories), laboratory, radiological studies and physician notes were reviewed prior to starting the face to face portion of the  visit. Questionnaire results were discussed with patient.\par

## 2022-02-07 LAB — BACTERIA UR CULT: NORMAL

## 2022-02-08 NOTE — ED ADULT NURSE NOTE - NSFALLRSKINDICATORS_ED_ALL_ED
I spoke with patient, she dropped an old fashion [employer] theomometer that broke Sunday. Pt states Fire dept. Came to her house and they advised pt reach out [facility] Emergency Management dept. They told pt to call PCP to see if anything needs to be checked Labs or what the protocol is after exposure to Mercury. Pt didn't reach out to poison control she was not advise to call them Sunday. Pt states she has no unusual symptoms. no

## 2022-02-11 ENCOUNTER — APPOINTMENT (OUTPATIENT)
Dept: UROLOGY | Facility: CLINIC | Age: 72
End: 2022-02-11
Payer: COMMERCIAL

## 2022-02-11 VITALS — HEART RATE: 80 BPM | SYSTOLIC BLOOD PRESSURE: 115 MMHG | DIASTOLIC BLOOD PRESSURE: 71 MMHG | TEMPERATURE: 97.1 F

## 2022-02-11 PROCEDURE — 81003 URINALYSIS AUTO W/O SCOPE: CPT | Mod: QW

## 2022-02-11 PROCEDURE — 51720 TREATMENT OF BLADDER LESION: CPT

## 2022-02-11 RX ORDER — BACILLUS CALMETTE-GUERIN 50 MG/50ML
50 POWDER, FOR SUSPENSION INTRAVESICAL
Qty: 0 | Refills: 0 | Status: COMPLETED | OUTPATIENT
Start: 2022-02-11

## 2022-02-11 RX ADMIN — BACILLUS CALMETTE-GUERIN 0 MG: 50 POWDER, FOR SUSPENSION INTRAVESICAL at 00:00

## 2022-02-11 NOTE — HISTORY OF PRESENT ILLNESS
[FreeTextEntry1] : 70 YO F seen  8/6/2021 as NPT. She comes for interval FU of a previously treated TCC bladder. She reports normal voiding with no dysuria or gross hematuria. No interval symptomatic UTIs.\par \par Patient seen 2/20/2018 for annual BTFU cystoscopy for low grade superficial TCC removed 7/1/2010. FISH negative at that time.\par \par UA trace RBCs, trace WBCs. Urine tests sent and cystoscopy scheduled.\par \par Patient seen  8/18/2021 for cystoscopy.\par urine C+S negative\par urine cytology negative\par FISH negative.\par Cystoscopy revealed recurrence left lateral wall multifocal TCC.\par PAtient underwent TURBT 9/3/2021 at [facility]. PAt superficial, non-invasive TCC low grade with 5% high grade. No muscularis propria noted. \par \par Patient seen  9/10/2021 for assessment of her post op course, review of pathology and plan further testing and treatment. She told me that she is voiding well with no gross hematuria but still has mild pelvic discomfort. \par \par Pathology report reviewed which showed non-invasive TCC predominantly low grade with 5% high grade. lamina propria not invaded, no muscularis propria identified, even on deep specimen.\par UA Nod RBC, MOd WBC, Nitrite negative. \par \par Patient underwent cystoscopy, second look and bladder biopsy of the base of the tumor on 11/3/2021, Tolerated well. Due to deep sampling, the Belle catheter was left indwelling. PATHOLOGY showed no evidence of recurrent cancer, including in muscularis propria.\par \par Patient seen  11/10/2021 for voiding trial which was successful. SHe has been having pain from the Belle catheter. She is presently on Rituxan for Immunosuppression for her R. A  through Dr. Maurice Palafox [phone]. \par UA RBC\[facility] \par Patient seen  12/20/2021 for urine check and to plan weekly BCG x 6. She continues to have mild dysuria and urethral stinging which has improved since surgery.\par UA large RBC, WBC, negative nitrites\par \par Patient seen  1/7/2022 for 1st of 6 planned BCG instillations. Urine C+S from 1/3/2022 negative.\par UA trace RBC, Large WBC\par \par Patient seen 1/13/2002 for 2/6 BCG instillations. She tolerated the 1 st dose without incident. She continues to have dysuria intermittently but is more concerned over her RA which keeps becoming more symptomatic.\par UA large WBC only.\par C+S positive >100 K Enterococcus S to all.\par We elected not to treat the urine culture but to continue with the BCG due to her desire to complete the course and return on her treatment for her R. A.\par \par Patient seen  1/21/2022 for BCG #3/6. Her bladder discomfort is unchanged and tolerable, but her R.A. continues to be severe and symptomatic. \par UA small RBC, small WBC only.\par C+S 50 - 99 K Enterococcus\par \par Patient seen  1/28/2022 for BCG # 4/6. She told me that she is tolerating the BCG well with no problems retaining it for 2 hours. Her LUTS have improved. She started on IV Methotrexate with her Rheumatologist this week for her very symptomatic Rheumatoid Arthritis.\par UA No RBC, Trace WBC, Nitrite negative.\par \par Patient seen 2/4/2022 for BCG # 5/6. She tolerated the last dose well with minimal dysuria on day 1. She continues to suffer from her R.A.\par UA negative. BCG #5/6 instilled today.\par C+S negative.\par \par Patient seen TODAY 2/11/2022 for BCG #6/6. She reports no problems with tolerating the BCG so far, but continues to suffer from the R.A. She is now on steroid medication from her pain management MD.\par UA\par \par Patient remains under care for her HTN and Rheumatoid Arthritis and is now in IV Rx for the latter. She is allergic to Sulfa drugs.\par \par  The patient denies fevers, chills, nausea and or vomiting and no unexplained weight loss. \par All pertinent parts of the patient PFSH (past medical, family and social histories), laboratory, radiological studies and physician notes were reviewed prior to starting the face to face portion of the  visit. Questionnaire results were discussed with patient.\par

## 2022-02-11 NOTE — ASSESSMENT
[FreeTextEntry1] : BCG instilled today without difficulty.  Patient scheduled to return in 6 weeks for an office cystoscopy. Swati Arciniega MD\par

## 2022-02-15 LAB — BACTERIA UR CULT: NORMAL

## 2022-02-25 ENCOUNTER — APPOINTMENT (OUTPATIENT)
Dept: UROLOGY | Facility: CLINIC | Age: 72
End: 2022-02-25

## 2022-03-03 ENCOUNTER — NON-APPOINTMENT (OUTPATIENT)
Age: 72
End: 2022-03-03

## 2022-03-25 ENCOUNTER — APPOINTMENT (OUTPATIENT)
Dept: UROLOGY | Facility: CLINIC | Age: 72
End: 2022-03-25
Payer: COMMERCIAL

## 2022-03-25 VITALS — SYSTOLIC BLOOD PRESSURE: 119 MMHG | DIASTOLIC BLOOD PRESSURE: 62 MMHG | TEMPERATURE: 97.2 F | HEART RATE: 79 BPM

## 2022-03-25 LAB
BILIRUB UR QL STRIP: NEGATIVE
CLARITY UR: CLEAR
COLLECTION METHOD: NORMAL
GLUCOSE UR-MCNC: NEGATIVE
HCG UR QL: 0.2 EU/DL
HGB UR QL STRIP.AUTO: NORMAL
KETONES UR-MCNC: NEGATIVE
LEUKOCYTE ESTERASE UR QL STRIP: NORMAL
NITRITE UR QL STRIP: NEGATIVE
PH UR STRIP: 7
PROT UR STRIP-MCNC: NEGATIVE
SP GR UR STRIP: 1.01

## 2022-03-25 PROCEDURE — 81003 URINALYSIS AUTO W/O SCOPE: CPT | Mod: QW

## 2022-03-25 PROCEDURE — 52000 CYSTOURETHROSCOPY: CPT

## 2022-03-25 NOTE — ASSESSMENT
[FreeTextEntry1] : We discussed findings on cystoscopy today and I sent urine for culture cytology and FISH test.  If any of these returned positive then we will remove the 2 tiny areas of erythema noted above in the office with nitrous oxide anesthesia to augment the local.  If the urine test 3 turn normal, then we will plan for repeat cystoscopy in the office in 3 months.\par \par As for maintenance BCG, the patient is having significant symptoms from her rheumatoid arthritis.  Because of this and because of the general shortage of BCG availability, we will consider using gemcitabine for intravesical therapy as needed in the near future. Swati Arciniega MD\par

## 2022-03-30 LAB
BACTERIA UR CULT: ABNORMAL
HLX UV FISH FINAL REPORT: NORMAL
URINE CYTOLOGY: NORMAL

## 2022-03-31 ENCOUNTER — NON-APPOINTMENT (OUTPATIENT)
Age: 72
End: 2022-03-31

## 2022-04-07 ENCOUNTER — APPOINTMENT (OUTPATIENT)
Dept: UROLOGY | Facility: CLINIC | Age: 72
End: 2022-04-07

## 2022-04-07 LAB — BACTERIA UR CULT: NORMAL

## 2022-04-12 ENCOUNTER — NON-APPOINTMENT (OUTPATIENT)
Age: 72
End: 2022-04-12

## 2022-05-02 ENCOUNTER — NON-APPOINTMENT (OUTPATIENT)
Age: 72
End: 2022-05-02

## 2022-05-06 ENCOUNTER — APPOINTMENT (OUTPATIENT)
Dept: UROLOGY | Facility: CLINIC | Age: 72
End: 2022-05-06
Payer: COMMERCIAL

## 2022-05-06 VITALS
HEART RATE: 76 BPM | WEIGHT: 114 LBS | TEMPERATURE: 97 F | HEIGHT: 63 IN | DIASTOLIC BLOOD PRESSURE: 72 MMHG | BODY MASS INDEX: 20.2 KG/M2 | SYSTOLIC BLOOD PRESSURE: 124 MMHG

## 2022-05-06 LAB
BILIRUB UR QL STRIP: NEGATIVE
CLARITY UR: CLEAR
COLLECTION METHOD: NORMAL
GLUCOSE UR-MCNC: NEGATIVE
HCG UR QL: 0.2 EU/DL
HGB UR QL STRIP.AUTO: NORMAL
KETONES UR-MCNC: NEGATIVE
LEUKOCYTE ESTERASE UR QL STRIP: NEGATIVE
NITRITE UR QL STRIP: NEGATIVE
PH UR STRIP: 7.5
PROT UR STRIP-MCNC: NEGATIVE
SP GR UR STRIP: 1.01

## 2022-05-06 PROCEDURE — 81003 URINALYSIS AUTO W/O SCOPE: CPT | Mod: QW

## 2022-05-06 PROCEDURE — 52000 CYSTOURETHROSCOPY: CPT

## 2022-05-06 NOTE — ASSESSMENT
[FreeTextEntry1] : Return in 3 months for the next interval BTFU  cystoscopy. Swati Arciniega MD\par

## 2022-05-06 NOTE — HISTORY OF PRESENT ILLNESS
[FreeTextEntry1] : 70 YO F seen  8/6/2021 as NPT. She comes for interval FU of a previously treated TCC bladder. She reports normal voiding with no dysuria or gross hematuria. No interval symptomatic UTIs.\par \par Patient seen 2/20/2018 for annual BTFU cystoscopy for low grade superficial TCC removed 7/1/2010. FISH negative at that time.\par \par UA trace RBCs, trace WBCs. Urine tests sent and cystoscopy scheduled.\par \par Patient seen  8/18/2021 for cystoscopy.\par urine C+S negative\par urine cytology negative\par FISH negative.\par Cystoscopy revealed recurrence left lateral wall multifocal TCC.\par PAtient underwent TURBT 9/3/2021 at [place]. PAt superficial, non-invasive TCC low grade with 5% high grade. No muscularis propria noted. \par \par Patient seen  9/10/2021 for assessment of her post op course, review of pathology and plan further testing and treatment. She told me that she is voiding well with no gross hematuria but still has mild pelvic discomfort. \par \par Pathology report reviewed which showed non-invasive TCC predominantly low grade with 5% high grade. lamina propria not invaded, no muscularis propria identified, even on deep specimen.\par UA Nod RBC, MOd WBC, Nitrite negative. \par \par Patient underwent cystoscopy, second look and bladder biopsy of the base of the tumor on 11/3/2021, Tolerated well. Due to deep sampling, the Belle catheter was left indwelling. PATHOLOGY showed no evidence of recurrent cancer, including in muscularis propria.\par \par Patient seen  11/10/2021 for voiding trial which was successful. SHe has been having pain from the Belle catheter. She is presently on Rituxan for Immunosuppression for her R. A  through Dr. Maurice Palafox [phone]. \par UA RBC\[facility] \par Patient seen  12/20/2021 for urine check and to plan weekly BCG x 6. She continues to have mild dysuria and urethral stinging which has improved since surgery.\par UA large RBC, WBC, negative nitrites\par \par Patient seen  1/7/2022 for 1st of 6 planned BCG instillations. Urine C+S from 1/3/2022 negative.\par UA trace RBC, Large WBC\par \par Patient seen 1/13/2002 for 2/6 BCG instillations. She tolerated the 1 st dose without incident. She continues to have dysuria intermittently but is more concerned over her RA which keeps becoming more symptomatic.\par UA large WBC only.\par C+S positive >100 K Enterococcus S to all.\par We elected not to treat the urine culture but to continue with the BCG due to her desire to complete the course and return on her treatment for her R. A.\par \par Patient seen  1/21/2022 for BCG #3/6. Her bladder discomfort is unchanged and tolerable, but her R.A. continues to be severe and symptomatic. \par UA small RBC, small WBC only.\par C+S 50 - 99 K Enterococcus\par \par Patient seen  1/28/2022 for BCG # 4/6. She told me that she is tolerating the BCG well with no problems retaining it for 2 hours. Her LUTS have improved. She started on IV Methotrexate with her Rheumatologist this week for her very symptomatic Rheumatoid Arthritis.\par UA No RBC, Trace WBC, Nitrite negative.\par \par Patient seen 2/4/2022 for BCG # 5/6. She tolerated the last dose well with minimal dysuria on day 1. She continues to suffer from her R.A.\par UA negative. BCG #5/6 instilled today.\par C+S negative.\par \par Patient seen  2/11/2022 for BCG #6/6. She reports no problems with tolerating the BCG so far, but continues to suffer from the R.A. She is now on steroid medication from her pain management MD.\par UA negative\par C+S negative\par \par Patient seen  3/25/2022 for BTFU cystoscopy. She is having severe back symptoms from her R.A. No urinary issues. Cystoscopy today showed no recurrent TCC, 2 tiny areas of erythema (1 mm each) on the left posterior wall distinct from areas of prior resection.\par Urine cytology negative\par FISH negative.\par \par Patient seen TODAY 5/6/2022 for repeat cystoscopy and possible biopsy with NO.  Cystoscopy today no evidence of recurrence, prior 2 areas were not suspicious in any way today. \par UA trace RBC\par Patient remains under care for her HTN and Rheumatoid Arthritis and is now in IV Rx for the latter. She is allergic to Sulfa drugs.\par  The patient denies fevers, chills, nausea and or vomiting and no unexplained weight loss. \par All pertinent parts of the patient PFSH (past medical, family and social histories), laboratory, radiological studies and physician notes were reviewed prior to starting the face to face portion of the  visit. Questionnaire results were discussed with patient.\par

## 2022-05-06 NOTE — ADDENDUM
[FreeTextEntry1] : In view of the negative cystoscopy today and the patient's longstanding issues with rheumatoid arthritis, we will delay any maintenance chemotherapy at this time continue with our plans to repeat cystoscopy 3 months. Swati Arciniega MD\par

## 2022-05-09 LAB — BACTERIA UR CULT: NORMAL

## 2022-05-11 LAB
HLX UV FISH FINAL REPORT: NORMAL
URINE CYTOLOGY: NORMAL

## 2022-08-12 ENCOUNTER — APPOINTMENT (OUTPATIENT)
Dept: UROLOGY | Facility: CLINIC | Age: 72
End: 2022-08-12

## 2022-08-12 VITALS
WEIGHT: 114 LBS | HEART RATE: 83 BPM | BODY MASS INDEX: 20.2 KG/M2 | DIASTOLIC BLOOD PRESSURE: 88 MMHG | TEMPERATURE: 97.8 F | HEIGHT: 63 IN | SYSTOLIC BLOOD PRESSURE: 161 MMHG

## 2022-08-12 VITALS — DIASTOLIC BLOOD PRESSURE: 78 MMHG | SYSTOLIC BLOOD PRESSURE: 132 MMHG | HEART RATE: 82 BPM

## 2022-08-12 PROCEDURE — 52000 CYSTOURETHROSCOPY: CPT

## 2022-08-12 PROCEDURE — 81003 URINALYSIS AUTO W/O SCOPE: CPT | Mod: QW

## 2022-08-12 NOTE — HISTORY OF PRESENT ILLNESS
[FreeTextEntry1] : 72 YO F seen  8/6/2021 as NPT. She comes for interval FU of a previously treated TCC bladder. She reports normal voiding with no dysuria or gross hematuria. No interval symptomatic UTIs.\par \par Patient seen 2/20/2018 for annual BTFU cystoscopy for low grade superficial TCC removed 7/1/2010. FISH negative at that time.\par \par UA trace RBCs, trace WBCs. Urine tests sent and cystoscopy scheduled.\par \par Patient seen  8/18/2021 for cystoscopy.\par urine C+S negative\par urine cytology negative\par FISH negative.\par Cystoscopy revealed recurrence left lateral wall multifocal TCC.\par PAtient underwent TURBT 9/3/2021 at [facility]. PAt superficial, non-invasive TCC low grade with 5% high grade. No muscularis propria noted. \par \par Patient seen  9/10/2021 for assessment of her post op course, review of pathology and plan further testing and treatment. She told me that she is voiding well with no gross hematuria but still has mild pelvic discomfort. \par \par Pathology report reviewed which showed non-invasive TCC predominantly low grade with 5% high grade. lamina propria not invaded, no muscularis propria identified, even on deep specimen.\par UA Nod RBC, MOd WBC, Nitrite negative. \par \par Patient underwent cystoscopy, second look and bladder biopsy of the base of the tumor on 11/3/2021, Tolerated well. Due to deep sampling, the Belle catheter was left indwelling. PATHOLOGY showed no evidence of recurrent cancer, including in muscularis propria.\par \par Patient seen  11/10/2021 for voiding trial which was successful. SHe has been having pain from the Belle catheter. She is presently on Rituxan for Immunosuppression for her R. A  through Dr. Maurice Palafox [phone]. \par UA RBC\[facility] \par Patient seen  12/20/2021 for urine check and to plan weekly BCG x 6. She continues to have mild dysuria and urethral stinging which has improved since surgery.\par UA large RBC, WBC, negative nitrites\par \par Patient seen  1/7/2022 for 1st of 6 planned BCG instillations. Urine C+S from 1/3/2022 negative.\par UA trace RBC, Large WBC\par \par Patient seen 1/13/2002 for 2/6 BCG instillations. She tolerated the 1 st dose without incident. She continues to have dysuria intermittently but is more concerned over her RA which keeps becoming more symptomatic.\par UA large WBC only.\par C+S positive >100 K Enterococcus S to all.\par We elected not to treat the urine culture but to continue with the BCG due to her desire to complete the course and return on her treatment for her R. A.\par \par Patient seen  1/21/2022 for BCG #3/6. Her bladder discomfort is unchanged and tolerable, but her R.A. continues to be severe and symptomatic. \par UA small RBC, small WBC only.\par C+S 50 - 99 K Enterococcus\par \par Patient seen  1/28/2022 for BCG # 4/6. She told me that she is tolerating the BCG well with no problems retaining it for 2 hours. Her LUTS have improved. She started on IV Methotrexate with her Rheumatologist this week for her very symptomatic Rheumatoid Arthritis.\par UA No RBC, Trace WBC, Nitrite negative.\par \par Patient seen 2/4/2022 for BCG # 5/6. She tolerated the last dose well with minimal dysuria on day 1. She continues to suffer from her R.A.\par UA negative. BCG #5/6 instilled today.\par C+S negative.\par \par Patient seen  2/11/2022 for BCG #6/6. She reports no problems with tolerating the BCG so far, but continues to suffer from the R.A. She is now on steroid medication from her pain management MD.\par UA negative\par C+S negative\par \par Patient seen  3/25/2022 for BTFU cystoscopy. She is having severe back symptoms from her R.A. No urinary issues. Cystoscopy today showed no recurrent TCC, 2 tiny areas of erythema (1 mm each) on the left posterior wall distinct from areas of prior resection.\par Urine cytology negative\par FISH negative.\par \par Patient seen  5/6/2022 for repeat cystoscopy and possible biopsy with NO.  Cystoscopy today no evidence of recurrence, prior 2 areas were not suspicious in any way today. \par UA trace RBC\par C+S, cytology FISH all negative.\par \par Patient seen TODAY 8/12/2022 for interval cystoscopy. She has had no bleeding. Her arthritis will require extensive lumbar surgery which is planned for September. She is finishing a course of doxycycline today due to a recent jelly fish sting.\par UA trace RBC.\par Cystoscopy today showed no recurrences.\par \par Patient remains under care for her HTN and Rheumatoid Arthritis and is now in IV Rx for the latter. She is allergic to Sulfa drugs.\par  The patient denies fevers, chills, nausea and or vomiting and no unexplained weight loss. \par All pertinent parts of the patient PFSH (past medical, family and social histories), laboratory, radiological studies and physician notes were reviewed prior to starting the face to face portion of the  visit. Questionnaire results were discussed with patient.\par

## 2022-08-12 NOTE — ASSESSMENT
[FreeTextEntry1] : Urine sent for C+S, cytology and FISH testing. if all negative then FU 3 months for next BTFU cystoscopy. Swati Arciniega MD\par

## 2022-08-17 LAB
BACTERIA UR CULT: NORMAL
URINE CYTOLOGY: NORMAL

## 2022-08-19 LAB — HLX UV FISH FINAL REPORT: NORMAL

## 2022-08-24 ENCOUNTER — NON-APPOINTMENT (OUTPATIENT)
Age: 72
End: 2022-08-24

## 2022-12-08 ENCOUNTER — APPOINTMENT (OUTPATIENT)
Dept: OTOLARYNGOLOGY | Facility: CLINIC | Age: 72
End: 2022-12-08

## 2022-12-08 VITALS — BODY MASS INDEX: 20.38 KG/M2 | WEIGHT: 115 LBS | HEIGHT: 63 IN | TEMPERATURE: 96.9 F

## 2022-12-08 DIAGNOSIS — Z72.89 OTHER PROBLEMS RELATED TO LIFESTYLE: ICD-10-CM

## 2022-12-08 DIAGNOSIS — H90.3 SENSORINEURAL HEARING LOSS, BILATERAL: ICD-10-CM

## 2022-12-08 DIAGNOSIS — H81.09 MENIERE'S DISEASE, UNSPECIFIED EAR: ICD-10-CM

## 2022-12-08 DIAGNOSIS — Z87.891 PERSONAL HISTORY OF NICOTINE DEPENDENCE: ICD-10-CM

## 2022-12-08 DIAGNOSIS — Z96.21 COCHLEAR IMPLANT STATUS: ICD-10-CM

## 2022-12-08 PROCEDURE — 99203 OFFICE O/P NEW LOW 30 MIN: CPT

## 2022-12-08 PROCEDURE — 92504 EAR MICROSCOPY EXAMINATION: CPT

## 2022-12-08 NOTE — ASSESSMENT
[FreeTextEntry1] : clinical monitoring recommended. Followup with implant audiology as.\par \par Follow up with me manually and as needed.

## 2022-12-08 NOTE — PHYSICAL EXAM
[FreeTextEntry1] : Procedure: Microscopic Ear Exam\par \par Left ear:  Ear canal intact without inflammation or lesion.  \par Tympanic membrane intact without inflammation. Area of atrophy posterior inferior without retraction or exposure.\par Postauricular area well-healed. No inflammation. No collection. No tenderness. Implant site intact\par \par Right ear:  Ear canal intact without inflammation or lesion.  \par Tympanic membrane intact without inflammation.\par \par  [Midline] : trachea located in midline position [Normal] : no rashes

## 2022-12-08 NOTE — CONSULT LETTER
[Please see my note below.] : Please see my note below. [FreeTextEntry2] : Dear EV BECERRA  [FreeTextEntry1] : Thank you for allowing me to participate in the care of JOSUE NUNEZ .\par Please see the attached visit note.\par \par \par \par Maurice Zamora\par Otology\par Medical Director of [employer] [facility]\par Department of Otolaryngology\par [facility]

## 2022-12-08 NOTE — HISTORY OF PRESENT ILLNESS
[de-identified] : JOSUE NUNEZ has a history of Meniere's disease with significant hearing loss. Underwent Left Cochlear implantation in 2014 ([employer]).  Using amplification in the right ear. \par Otologic history:\par 1990s: Left endolymphatic sac shunt\par 2014: Left cochlear implant advance [employer] high-res 90 mid zaid [FreeTextEntry1] : 12/08/2022 \par Hearing fluctuations reported bilaterally. No recent vertigo, but this has occurred intermittently.

## 2022-12-09 ENCOUNTER — APPOINTMENT (OUTPATIENT)
Dept: UROLOGY | Facility: CLINIC | Age: 72
End: 2022-12-09

## 2022-12-09 VITALS
HEART RATE: 97 BPM | SYSTOLIC BLOOD PRESSURE: 160 MMHG | OXYGEN SATURATION: 100 % | TEMPERATURE: 98 F | DIASTOLIC BLOOD PRESSURE: 79 MMHG

## 2022-12-09 PROCEDURE — 52000 CYSTOURETHROSCOPY: CPT

## 2022-12-09 PROCEDURE — 81003 URINALYSIS AUTO W/O SCOPE: CPT | Mod: QW

## 2022-12-09 NOTE — LETTER BODY
[Dear  ___] : Dear  [unfilled], [Courtesy Letter:] : I had the pleasure of seeing your patient, [unfilled], in my office today. [Please see my note below.] : Please see my note below. [Consult Closing:] : Thank you very much for allowing me to participate in the care of this patient.  If you have any questions, please do not hesitate to contact me. [FreeTextEntry1] : Best Regards, \par \par Swati Arciniega MD\par

## 2022-12-09 NOTE — HISTORY OF PRESENT ILLNESS
[FreeTextEntry1] : 70 YO F seen  8/6/2021 as NPT. She comes for interval FU of a previously treated TCC bladder. She reports normal voiding with no dysuria or gross hematuria. No interval symptomatic UTIs.\par \par Patient seen 2/20/2018 for annual BTFU cystoscopy for low grade superficial TCC removed 7/1/2010. FISH negative at that time.\par \par UA trace RBCs, trace WBCs. Urine tests sent and cystoscopy scheduled.\par \par Patient seen  8/18/2021 for cystoscopy.\par urine C+S negative\par urine cytology negative\par FISH negative.\par Cystoscopy revealed recurrence left lateral wall multifocal TCC.\par PAtient underwent TURBT 9/3/2021 at [facility]. PAt superficial, non-invasive TCC low grade with 5% high grade. No muscularis propria noted. \par \par Patient seen  9/10/2021 for assessment of her post op course, review of pathology and plan further testing and treatment. She told me that she is voiding well with no gross hematuria but still has mild pelvic discomfort. \par \par Pathology report reviewed which showed non-invasive TCC predominantly low grade with 5% high grade. lamina propria not invaded, no muscularis propria identified, even on deep specimen.\par UA Nod RBC, MOd WBC, Nitrite negative. \par \par Patient underwent cystoscopy, second look and bladder biopsy of the base of the tumor on 11/3/2021, Tolerated well. Due to deep sampling, the Belle catheter was left indwelling. PATHOLOGY showed no evidence of recurrent cancer, including in muscularis propria.\par \par Patient seen  11/10/2021 for voiding trial which was successful. SHe has been having pain from the Belle catheter. She is presently on Rituxan for Immunosuppression for her R. A  through Dr. Maurice Palafox [phone]. \par UA RBC\[facility] \par Patient seen  12/20/2021 for urine check and to plan weekly BCG x 6. She continues to have mild dysuria and urethral stinging which has improved since surgery.\par UA large RBC, WBC, negative nitrites\par \par Patient seen  1/7/2022 for 1st of 6 planned BCG instillations. Urine C+S from 1/3/2022 negative.\par UA trace RBC, Large WBC\par \par Patient seen 1/13/2002 for 2/6 BCG instillations. She tolerated the 1 st dose without incident. She continues to have dysuria intermittently but is more concerned over her RA which keeps becoming more symptomatic.\par UA large WBC only.\par C+S positive >100 K Enterococcus S to all.\par We elected not to treat the urine culture but to continue with the BCG due to her desire to complete the course and return on her treatment for her R. A.\par \par Patient seen  1/21/2022 for BCG #3/6. Her bladder discomfort is unchanged and tolerable, but her R.A. continues to be severe and symptomatic. \par UA small RBC, small WBC only.\par C+S 50 - 99 K Enterococcus\par \par Patient seen  1/28/2022 for BCG # 4/6. She told me that she is tolerating the BCG well with no problems retaining it for 2 hours. Her LUTS have improved. She started on IV Methotrexate with her Rheumatologist this week for her very symptomatic Rheumatoid Arthritis.\par UA No RBC, Trace WBC, Nitrite negative.\par \par Patient seen 2/4/2022 for BCG # 5/6. She tolerated the last dose well with minimal dysuria on day 1. She continues to suffer from her R.A.\par UA negative. BCG #5/6 instilled today.\par C+S negative.\par \par Patient seen  2/11/2022 for BCG #6/6. She reports no problems with tolerating the BCG so far, but continues to suffer from the R.A. She is now on steroid medication from her pain management MD.\par UA negative\par C+S negative\par \par Patient seen  3/25/2022 for BTFU cystoscopy. She is having severe back symptoms from her R.A. No urinary issues. Cystoscopy today showed no recurrent TCC, 2 tiny areas of erythema (1 mm each) on the left posterior wall distinct from areas of prior resection.\par Urine cytology negative\par FISH negative.\par \par Patient seen  5/6/2022 for repeat cystoscopy and possible biopsy with NO.  Cystoscopy today no evidence of recurrence, prior 2 areas were not suspicious in any way today. \par UA trace RBC\par C+S, cytology FISH all negative.\par \par Patient seen  8/12/2022 for interval cystoscopy. She has had no bleeding. Her arthritis will require extensive lumbar surgery which is planned for September. She is finishing a course of doxycycline today due to a recent jelly fish sting.\par UA trace RBC.\par Cystoscopy today showed no recurrences.\par Urine sent for C+S, cytology and FISH testing. if all negative then FU 3 months for next BTFU cystoscopy. \par C+S, cytology, FISH all negative.\par \par Patient seen TODAY 12/9/2022 for BTFU cystoscopy. She has not resumed her RA medications as of yet because she recently had extensive spinal fusion surgery, and is wearing a brace since September. She is due to resume her infusions for the R.A. next month.\par Cystoscopy showed WINDY.\par UA trace WBC only.\par \par Patient remains under care for her HTN and Rheumatoid Arthritis and is now in IV Rx for the latter. She is allergic to Sulfa drugs.\par  The patient denies fevers, chills, nausea and or vomiting and no unexplained weight loss. \par All pertinent parts of the patient PFSH (past medical, family and social histories), laboratory, radiological studies and physician notes were reviewed prior to starting the face to face portion of the  visit. Questionnaire results were discussed with patient.\par

## 2022-12-09 NOTE — ASSESSMENT
[FreeTextEntry1] : Cystoscopy today did not reveal any recurrent bladder tumors. She will resume her RA medications as advised. She will not be receiving maintenance BCG due to her anti-inflammatory therapy.. She will follow up in 3 months for her next surveillance cystoscopy. Swati Arciniega MD\par

## 2022-12-12 ENCOUNTER — NON-APPOINTMENT (OUTPATIENT)
Age: 72
End: 2022-12-12

## 2022-12-14 ENCOUNTER — NON-APPOINTMENT (OUTPATIENT)
Age: 72
End: 2022-12-14

## 2022-12-14 LAB
BACTERIA UR CULT: ABNORMAL
URINE CYTOLOGY: NORMAL

## 2023-01-06 ENCOUNTER — NON-APPOINTMENT (OUTPATIENT)
Age: 73
End: 2023-01-06

## 2023-03-09 ENCOUNTER — RESULT CHARGE (OUTPATIENT)
Age: 73
End: 2023-03-09

## 2023-03-09 ENCOUNTER — APPOINTMENT (OUTPATIENT)
Dept: UROLOGY | Facility: CLINIC | Age: 73
End: 2023-03-09
Payer: MEDICARE

## 2023-03-09 VITALS
OXYGEN SATURATION: 100 % | DIASTOLIC BLOOD PRESSURE: 76 MMHG | WEIGHT: 115 LBS | HEART RATE: 89 BPM | SYSTOLIC BLOOD PRESSURE: 126 MMHG | HEIGHT: 63 IN | BODY MASS INDEX: 20.38 KG/M2

## 2023-03-09 LAB
BILIRUB UR QL STRIP: NORMAL
CLARITY UR: CLEAR
COLLECTION METHOD: NORMAL
GLUCOSE UR-MCNC: NORMAL
HCG UR QL: 0.2 EU/DL
HGB UR QL STRIP.AUTO: NORMAL
KETONES UR-MCNC: NORMAL
LEUKOCYTE ESTERASE UR QL STRIP: NORMAL
NITRITE UR QL STRIP: NORMAL
PH UR STRIP: 6.5
PROT UR STRIP-MCNC: NORMAL
SP GR UR STRIP: 1.02

## 2023-03-09 PROCEDURE — 52000 CYSTOURETHROSCOPY: CPT

## 2023-03-09 NOTE — ASSESSMENT
[FreeTextEntry1] : We reviewed the results of the negative cystoscopy today I recommended follow-up cystoscopy in 3 months.  She is amenable to this approach and plans were made.\par A urine C+S, cytology, and FISH are pending.  Swati Arciniega MD\par

## 2023-03-09 NOTE — HISTORY OF PRESENT ILLNESS
[FreeTextEntry1] : 71 YO F seen  8/6/2021 as NPT. She comes for interval FU of a previously treated TCC bladder. She reports normal voiding with no dysuria or gross hematuria. No interval symptomatic UTIs.\par \par Patient seen 2/20/2018 for annual BTFU cystoscopy for low grade superficial TCC removed 7/1/2010. FISH negative at that time.\par \par UA trace RBCs, trace WBCs. Urine tests sent and cystoscopy scheduled.\par \par Patient seen  8/18/2021 for cystoscopy.\par urine C+S negative\par urine cytology negative\par FISH negative.\par Cystoscopy revealed recurrence left lateral wall multifocal TCC.\par PAtient underwent TURBT 9/3/2021 at [facility]. PAt superficial, non-invasive TCC low grade with 5% high grade. No muscularis propria noted. \par \par Patient seen  9/10/2021 for assessment of her post op course, review of pathology and plan further testing and treatment. She told me that she is voiding well with no gross hematuria but still has mild pelvic discomfort. \par \par Pathology report reviewed which showed non-invasive TCC predominantly low grade with 5% high grade. lamina propria not invaded, no muscularis propria identified, even on deep specimen.\par UA Nod RBC, MOd WBC, Nitrite negative. \par \par Patient underwent cystoscopy, second look and bladder biopsy of the base of the tumor on 11/3/2021, Tolerated well. Due to deep sampling, the Belle catheter was left indwelling. PATHOLOGY showed no evidence of recurrent cancer, including in muscularis propria.\par \par Patient seen  11/10/2021 for voiding trial which was successful. SHe has been having pain from the Belle catheter. She is presently on Rituxan for Immunosuppression for her R. A  through Dr. Maurice Palafox [phone]. \par UA RBC\[facility] \par Patient seen  12/20/2021 for urine check and to plan weekly BCG x 6. She continues to have mild dysuria and urethral stinging which has improved since surgery.\par UA large RBC, WBC, negative nitrites\par \par Patient seen  1/7/2022 for 1st of 6 planned BCG instillations. Urine C+S from 1/3/2022 negative.\par UA trace RBC, Large WBC\par \par Patient seen 1/13/2002 for 2/6 BCG instillations. She tolerated the 1 st dose without incident. She continues to have dysuria intermittently but is more concerned over her RA which keeps becoming more symptomatic.\par UA large WBC only.\par C+S positive >100 K Enterococcus S to all.\par We elected not to treat the urine culture but to continue with the BCG due to her desire to complete the course and return on her treatment for her R. A.\par \par Patient seen  1/21/2022 for BCG #3/6. Her bladder discomfort is unchanged and tolerable, but her R.A. continues to be severe and symptomatic. \par UA small RBC, small WBC only.\par C+S 50 - 99 K Enterococcus\par \par Patient seen  1/28/2022 for BCG # 4/6. She told me that she is tolerating the BCG well with no problems retaining it for 2 hours. Her LUTS have improved. She started on IV Methotrexate with her Rheumatologist this week for her very symptomatic Rheumatoid Arthritis.\par UA No RBC, Trace WBC, Nitrite negative.\par \par Patient seen 2/4/2022 for BCG # 5/6. She tolerated the last dose well with minimal dysuria on day 1. She continues to suffer from her R.A.\par UA negative. BCG #5/6 instilled today.\par C+S negative.\par \par Patient seen  2/11/2022 for BCG #6/6. She reports no problems with tolerating the BCG so far, but continues to suffer from the R.A. She is now on steroid medication from her pain management MD.\par UA negative\par C+S negative\par \par Patient seen  3/25/2022 for BTFU cystoscopy. She is having severe back symptoms from her R.A. No urinary issues. Cystoscopy today showed no recurrent TCC, 2 tiny areas of erythema (1 mm each) on the left posterior wall distinct from areas of prior resection.\par Urine cytology negative\par FISH negative.\par \par Patient seen  5/6/2022 for repeat cystoscopy and possible biopsy with NO.  Cystoscopy today no evidence of recurrence, prior 2 areas were not suspicious in any way today. \par UA trace RBC\par C+S, cytology FISH all negative.\par \par Patient seen  8/12/2022 for interval cystoscopy. She has had no bleeding. Her arthritis will require extensive lumbar surgery which is planned for September. She is finishing a course of doxycycline today due to a recent jelly fish sting.\par UA trace RBC.\par Cystoscopy today showed no recurrences.\par Urine sent for C+S, cytology and FISH testing. if all negative then FU 3 months for next BTFU cystoscopy. \par C+S, cytology, FISH all negative.\par \par Patient seen  12/9/2022 for BTFU cystoscopy. She has not resumed her RA medications as of yet because she recently had extensive spinal fusion surgery, and is wearing a brace since September. She is due to resume her infusions for the R.A. next month.\par Cystoscopy showed WINDY.\par UA trace WBC only.\par Cystoscopy today did not reveal any recurrent bladder tumors. She will resume her RA medications as advised. She will not be receiving maintenance BCG due to her anti-inflammatory therapy.. She will follow up in 3 months for her next surveillance cystoscopy. \par \par Patient seen TODAY 3/9//2023 for FU cystoscopy.  Se has not taken infusion therapy for her RA in the last year but is anticipating using it soon. No other  issues or gross hematuria. Cystoscopy today no recurrences.\par \par Patient remains under care for her HTN and Rheumatoid Arthritis and is now in IV Rx for the latter. She is allergic to Sulfa drugs.\par  The patient denies fevers, chills, nausea and or vomiting and no unexplained weight loss. \par All pertinent parts of the patient PFSH (past medical, family and social histories), laboratory, radiological studies and physician notes were reviewed prior to starting the face to face portion of the  visit. Questionnaire results were discussed with patient.\par

## 2023-03-09 NOTE — LETTER BODY
[Dear  ___] : Dear  [unfilled], [Please see my note below.] : Please see my note below. [Consult Closing:] : Thank you very much for allowing me to participate in the care of this patient.  If you have any questions, please do not hesitate to contact me. [FreeTextEntry3] : Best Regards, \par \par Swati Arciniega MD\par

## 2023-03-10 ENCOUNTER — NON-APPOINTMENT (OUTPATIENT)
Age: 73
End: 2023-03-10

## 2023-03-10 LAB — URINE CYTOLOGY: NORMAL

## 2023-03-13 ENCOUNTER — NON-APPOINTMENT (OUTPATIENT)
Age: 73
End: 2023-03-13

## 2023-03-13 LAB — BACTERIA UR CULT: NORMAL

## 2023-03-14 ENCOUNTER — NON-APPOINTMENT (OUTPATIENT)
Age: 73
End: 2023-03-14

## 2023-03-14 LAB — HLX UV FISH FINAL REPORT: NORMAL

## 2023-06-14 ENCOUNTER — APPOINTMENT (OUTPATIENT)
Dept: UROLOGY | Facility: CLINIC | Age: 73
End: 2023-06-14

## 2023-06-27 ENCOUNTER — APPOINTMENT (OUTPATIENT)
Dept: UROLOGY | Facility: CLINIC | Age: 73
End: 2023-06-27
Payer: MEDICARE

## 2023-06-27 VITALS
BODY MASS INDEX: 20.82 KG/M2 | WEIGHT: 119 LBS | HEART RATE: 77 BPM | HEIGHT: 63.5 IN | OXYGEN SATURATION: 98 % | TEMPERATURE: 98.2 F | DIASTOLIC BLOOD PRESSURE: 78 MMHG | SYSTOLIC BLOOD PRESSURE: 130 MMHG

## 2023-06-27 PROCEDURE — 81003 URINALYSIS AUTO W/O SCOPE: CPT | Mod: QW

## 2023-06-27 PROCEDURE — 52000 CYSTOURETHROSCOPY: CPT

## 2023-06-27 NOTE — HISTORY OF PRESENT ILLNESS
[FreeTextEntry1] : 73 YO F seen  8/6/2021 as NPT. She comes for interval FU of a previously treated TCC bladder. She reports normal voiding with no dysuria or gross hematuria. No interval symptomatic UTIs.\par \par Patient seen 2/20/2018 for annual BTFU cystoscopy for low grade superficial TCC removed 7/1/2010. FISH negative at that time.\par \par UA trace RBCs, trace WBCs. Urine tests sent and cystoscopy scheduled.\par \par Patient seen  8/18/2021 for cystoscopy.\par urine C+S negative\par urine cytology negative\par FISH negative.\par Cystoscopy revealed recurrence left lateral wall multifocal TCC.\par PAtient underwent TURBT 9/3/2021 at [facility]. PAt superficial, non-invasive TCC low grade with 5% high grade. No muscularis propria noted. \par \par Patient seen  9/10/2021 for assessment of her post op course, review of pathology and plan further testing and treatment. She told me that she is voiding well with no gross hematuria but still has mild pelvic discomfort. \par \par Pathology report reviewed which showed non-invasive TCC predominantly low grade with 5% high grade. lamina propria not invaded, no muscularis propria identified, even on deep specimen.\par UA Nod RBC, MOd WBC, Nitrite negative. \par \par Patient underwent cystoscopy, second look and bladder biopsy of the base of the tumor on 11/3/2021, Tolerated well. Due to deep sampling, the Belle catheter was left indwelling. PATHOLOGY showed no evidence of recurrent cancer, including in muscularis propria.\par \par Patient seen  11/10/2021 for voiding trial which was successful. SHe has been having pain from the Belle catheter. She is presently on Rituxan for Immunosuppression for her R. A  through Dr. Maurice Palafox [phone]. \par UA RBC\[facility] \par Patient seen  12/20/2021 for urine check and to plan weekly BCG x 6. She continues to have mild dysuria and urethral stinging which has improved since surgery.\par UA large RBC, WBC, negative nitrites\par \par Patient seen  1/7/2022 for 1st of 6 planned BCG instillations. Urine C+S from 1/3/2022 negative.\par UA trace RBC, Large WBC\par \par Patient seen 1/13/2002 for 2/6 BCG instillations. She tolerated the 1 st dose without incident. She continues to have dysuria intermittently but is more concerned over her RA which keeps becoming more symptomatic.\par UA large WBC only.\par C+S positive >100 K Enterococcus S to all.\par We elected not to treat the urine culture but to continue with the BCG due to her desire to complete the course and return on her treatment for her R. A.\par \par Patient seen  1/21/2022 for BCG #3/6. Her bladder discomfort is unchanged and tolerable, but her R.A. continues to be severe and symptomatic. \par UA small RBC, small WBC only.\par C+S 50 - 99 K Enterococcus\par \par Patient seen  1/28/2022 for BCG # 4/6. She told me that she is tolerating the BCG well with no problems retaining it for 2 hours. Her LUTS have improved. She started on IV Methotrexate with her Rheumatologist this week for her very symptomatic Rheumatoid Arthritis.\par UA No RBC, Trace WBC, Nitrite negative.\par \par Patient seen 2/4/2022 for BCG # 5/6. She tolerated the last dose well with minimal dysuria on day 1. She continues to suffer from her R.A.\par UA negative. BCG #5/6 instilled today.\par C+S negative.\par \par Patient seen  2/11/2022 for BCG #6/6. She reports no problems with tolerating the BCG so far, but continues to suffer from the R.A. She is now on steroid medication from her pain management MD.\par UA negative\par C+S negative\par \par Patient seen  3/25/2022 for BTFU cystoscopy. She is having severe back symptoms from her R.A. No urinary issues. Cystoscopy today showed no recurrent TCC, 2 tiny areas of erythema (1 mm each) on the left posterior wall distinct from areas of prior resection.\par Urine cytology negative\par FISH negative.\par \par Patient seen  5/6/2022 for repeat cystoscopy and possible biopsy with NO.  Cystoscopy today no evidence of recurrence, prior 2 areas were not suspicious in any way today. \par UA trace RBC\par C+S, cytology FISH all negative.\par \par Patient seen  8/12/2022 for interval cystoscopy. She has had no bleeding. Her arthritis will require extensive lumbar surgery which is planned for September. She is finishing a course of doxycycline today due to a recent jelly fish sting.\par UA trace RBC.\par Cystoscopy today showed no recurrences.\par Urine sent for C+S, cytology and FISH testing. if all negative then FU 3 months for next BTFU cystoscopy. \par C+S, cytology, FISH all negative.\par \par Patient seen  12/9/2022 for BTFU cystoscopy. She has not resumed her RA medications as of yet because she recently had extensive spinal fusion surgery, and is wearing a brace since September. She is due to resume her infusions for the R.A. next month.\par Cystoscopy showed WINDY.\par UA trace WBC only.\par Cystoscopy today did not reveal any recurrent bladder tumors. She will resume her RA medications as advised. She will not be receiving maintenance BCG due to her anti-inflammatory therapy.. She will follow up in 3 months for her next surveillance cystoscopy. \par \par Patient seen TODAY 3/9//2023 for FU cystoscopy.  Se has not taken infusion therapy for her RA in the last year but is anticipating using it soon. No other  issues or gross hematuria. Cystoscopy today no recurrences.\par We reviewed the results of the negative cystoscopy today I recommended follow-up cystoscopy in 3 months. She is amenable to this approach and plans were made.\par A urine C+S, cytology, and FISH are pending.\par C+S, cytology, FISH all negative.\par \par Patient seen TODAY 6/27/2023 for interval BTFU cystoscopy and urine testing. She denies dysuria and gross hematuria. No other major complaints. She received an infusion for her RA yesterday.\par UA trace WBC only.\par Cystoscopy today showed WINYD.\par \par Patient remains under care for her HTN and Rheumatoid Arthritis and is now in IV Rx for the latter. She is allergic to Sulfa drugs.\par  The patient denies fevers, chills, nausea and or vomiting and no unexplained weight loss. \par All pertinent parts of the patient PFSH (past medical, family and social histories), laboratory, radiological studies and physician notes were reviewed prior to starting the face to face portion of the  visit. Questionnaire results were discussed with patient.\par

## 2023-06-27 NOTE — ASSESSMENT
[FreeTextEntry1] : Today's cystoscopy was negative. A urine C+S, cytology, and FISH are pending. She will follow up in 3 months for her next interval cystoscopy. This will be 2nd anniversary of her last TURBT. Swati Arciniega MD\par

## 2023-06-29 LAB — URINE CYTOLOGY: NORMAL

## 2023-06-30 ENCOUNTER — NON-APPOINTMENT (OUTPATIENT)
Age: 73
End: 2023-06-30

## 2023-06-30 LAB — HLX UV FISH FINAL REPORT: NORMAL

## 2023-07-05 LAB — BACTERIA UR CULT: NORMAL

## 2023-07-11 ENCOUNTER — NON-APPOINTMENT (OUTPATIENT)
Age: 73
End: 2023-07-11

## 2023-07-12 ENCOUNTER — APPOINTMENT (OUTPATIENT)
Dept: UROLOGY | Facility: CLINIC | Age: 73
End: 2023-07-12

## 2023-09-21 ENCOUNTER — APPOINTMENT (OUTPATIENT)
Dept: UROLOGY | Facility: CLINIC | Age: 73
End: 2023-09-21
Payer: MEDICARE

## 2023-09-21 LAB
BILIRUB UR QL STRIP: NORMAL
CLARITY UR: CLEAR
COLLECTION METHOD: NORMAL
GLUCOSE UR-MCNC: NORMAL
HCG UR QL: 0.2 EU/DL
HGB UR QL STRIP.AUTO: ABNORMAL
KETONES UR-MCNC: NORMAL
LEUKOCYTE ESTERASE UR QL STRIP: NORMAL
NITRITE UR QL STRIP: NORMAL
PH UR STRIP: 6
PROT UR STRIP-MCNC: NORMAL
SP GR UR STRIP: 1.01

## 2023-09-21 PROCEDURE — 52000 CYSTOURETHROSCOPY: CPT

## 2023-09-21 PROCEDURE — 81003 URINALYSIS AUTO W/O SCOPE: CPT | Mod: QW

## 2023-09-27 LAB
BACTERIA UR CULT: NORMAL
URINE CYTOLOGY: NORMAL

## 2023-10-02 ENCOUNTER — NON-APPOINTMENT (OUTPATIENT)
Age: 73
End: 2023-10-02

## 2023-10-02 LAB — HLX UV FISH FINAL REPORT: NORMAL

## 2024-03-27 ENCOUNTER — APPOINTMENT (OUTPATIENT)
Dept: UROLOGY | Facility: CLINIC | Age: 74
End: 2024-03-27
Payer: MEDICARE

## 2024-03-27 DIAGNOSIS — C67.9 MALIGNANT NEOPLASM OF BLADDER, UNSPECIFIED: ICD-10-CM

## 2024-03-27 LAB
BILIRUB UR QL STRIP: NEGATIVE
CLARITY UR: CLEAR
COLLECTION METHOD: NORMAL
GLUCOSE UR-MCNC: NEGATIVE
HCG UR QL: 0.2 EU/DL
HGB UR QL STRIP.AUTO: NEGATIVE
KETONES UR-MCNC: ABNORMAL
LEUKOCYTE ESTERASE UR QL STRIP: NEGATIVE
NITRITE UR QL STRIP: NEGATIVE
PH UR STRIP: 7
PROT UR STRIP-MCNC: NEGATIVE
SP GR UR STRIP: 1.01

## 2024-03-27 PROCEDURE — 81003 URINALYSIS AUTO W/O SCOPE: CPT | Mod: QW

## 2024-03-27 PROCEDURE — 52000 CYSTOURETHROSCOPY: CPT

## 2024-03-27 NOTE — ASSESSMENT
[FreeTextEntry1] : Negative BTFU cystoscopy today.  Urine sent for culture cytology and FISH test.  We will review these results by phone.  If they remain stable, then next follow-up cystoscopy 6 months. Swati Arciniega MD

## 2024-03-27 NOTE — HISTORY OF PRESENT ILLNESS
[FreeTextEntry1] : 73 YO F seen  8/6/2021 as NPT. She comes for interval FU of a previously treated TCC bladder. She reports normal voiding with no dysuria or gross hematuria. No interval symptomatic UTIs.  Patient seen 2/20/2018 for annual BTFU cystoscopy for low grade superficial TCC removed 7/1/2010. FISH negative at that time.  UA trace RBCs, trace WBCs. Urine tests sent and cystoscopy scheduled.  Patient seen  8/18/2021 for cystoscopy. urine C+S negative urine cytology negative FISH negative. Cystoscopy revealed recurrence left lateral wall multifocal TCC. Patient underwent TURBT 9/3/2021 at [facility]. Path superficial, non-invasive TCC low grade with 5% high grade. No muscularis propria noted.   Patient seen  9/10/2021 for assessment of her post op course, review of pathology and plan further testing and treatment. She told me that she is voiding well with no gross hematuria but still has mild pelvic discomfort.   Pathology report reviewed which showed non-invasive TCC predominantly low grade with 5% high grade. lamina propria not invaded, no muscularis propria identified, even on deep specimen. UA Nod RBC, MOd WBC, Nitrite negative.   Patient underwent cystoscopy, second look and bladder biopsy of the base of the tumor on 11/3/2021, Tolerated well. Due to deep sampling, the Belle catheter was left indwelling. PATHOLOGY showed no evidence of recurrent cancer, including in muscularis propria.  Patient seen  11/10/2021 for voiding trial which was successful. SHe has been having pain from the Belle catheter. She is presently on Rituxan for Immunosuppression for her R. A  through Dr. Maurice Palafox [phone].  UA RBC  Patient seen  12/20/2021 for urine check and to plan weekly BCG x 6. She continues to have mild dysuria and urethral stinging which has improved since surgery. UA large RBC, WBC, negative nitrites  Patient seen  1/7/2022 for 1st of 6 planned BCG instillations. Urine C+S from 1/3/2022 negative. UA trace RBC, Large WBC  Patient seen 1/13/2002 for 2/6 BCG instillations. She tolerated the 1 st dose without incident. She continues to have dysuria intermittently but is more concerned over her RA which keeps becoming more symptomatic. UA large WBC only. C+S positive >100 K Enterococcus S to all. We elected not to treat the urine culture but to continue with the BCG due to her desire to complete the course and return on her treatment for her R. A.  Patient seen  1/21/2022 for BCG #3/6. Her bladder discomfort is unchanged and tolerable, but her R.A. continues to be severe and symptomatic.  UA small RBC, small WBC only. C+S 50 - 99 K Enterococcus  Patient seen  1/28/2022 for BCG # 4/6. She told me that she is tolerating the BCG well with no problems retaining it for 2 hours. Her LUTS have improved. She started on IV Methotrexate with her Rheumatologist this week for her very symptomatic Rheumatoid Arthritis. UA No RBC, Trace WBC, Nitrite negative.  Patient seen 2/4/2022 for BCG # 5/6. She tolerated the last dose well with minimal dysuria on day 1. She continues to suffer from her R.A. UA negative. BCG #5/6 instilled today. C+S negative.  Patient seen  2/11/2022 for BCG #6/6. She reports no problems with tolerating the BCG so far, but continues to suffer from the R.A. She is now on steroid medication from her pain management MD. UA negative C+S negative  Patient seen  3/25/2022 for BTFU cystoscopy. She is having severe back symptoms from her R.A. No urinary issues. Cystoscopy today showed no recurrent TCC, 2 tiny areas of erythema (1 mm each) on the left posterior wall distinct from areas of prior resection. Urine cytology negative FISH negative.  Patient seen  5/6/2022 for repeat cystoscopy and possible biopsy with NO.  Cystoscopy today no evidence of recurrence, prior 2 areas were not suspicious in any way today.  UA trace RBC C+S, cytology FISH all negative.  Patient seen  8/12/2022 for interval cystoscopy. She has had no bleeding. Her arthritis will require extensive lumbar surgery which is planned for September. She is finishing a course of doxycycline today due to a recent jelly fish sting. UA trace RBC. Cystoscopy today showed no recurrences. Urine sent for C+S, cytology and FISH testing. if all negative then FU 3 months for next BTFU cystoscopy.  C+S, cytology, FISH all negative.  Patient seen  12/9/2022 for BTFU cystoscopy. She has not resumed her RA medications as of yet because she recently had extensive spinal fusion surgery, and is wearing a brace since September. She is due to resume her infusions for the R.A. next month. Cystoscopy showed WINDY. UA trace WBC only. Cystoscopy today did not reveal any recurrent bladder tumors. She will resume her RA medications as advised. She will not be receiving maintenance BCG due to her anti-inflammatory therapy.. She will follow up in 3 months for her next surveillance cystoscopy.   Patient seen TODAY 3/9//2023 for FU cystoscopy.  Se has not taken infusion therapy for her RA in the last year but is anticipating using it soon. No other  issues or gross hematuria. Cystoscopy today no recurrences. We reviewed the results of the negative cystoscopy today I recommended follow-up cystoscopy in 3 months. She is amenable to this approach and plans were made. A urine C+S, cytology, and FISH are pending. C+S, cytology, FISH all negative.  Patient seen  6/27/2023 for interval BTFU cystoscopy and urine testing. She denies dysuria and gross hematuria. No other major complaints. She received an infusion for her RA yesterday. UA trace WBC only. Cystoscopy today showed WINDY. Patient remains under care for her HTN and Rheumatoid Arthritis and is now in IV Rx for the latter. She is allergic to Sulfa drugs.  The patient denies fevers, chills, nausea and or vomiting and no unexplained weight loss.  All pertinent parts of the patient PFSH (past medical, family and social histories), laboratory, radiological studies and physician notes were reviewed prior to starting the face to face portion of the  visit. Questionnaire results were discussed with patient. Today's cystoscopy was negative. A urine C+S, cytology, and FISH are pending. She will follow up in 3 months for her next interval cystoscopy. This will be 2nd anniversary of her last TURBT.  C+S > 3 organisms Cytology negative FISH negative  Patient seen 9/21/2023 for BTFU cystoscopy. She has been asymptomatic, no gross hematuria. UA trace RBC Cystoscopy: NO recurrence, normal UOs, normal urethra. Cystoscopy today negative in the 2-year anniversary of her last recurrence. Urine sent today for culture cytology and FISH test. If these are all negative, then we will advance to next cystoscopy in 6 months. C+S, cytology, FISH all negative  Patient seen TODAY 3/27/204 for 6-month BTFU cystoscopy. She denies any interval symptoms or gross hematuria. UA negative CYSTOSCOPY: NO RECURRENT TCC.

## 2024-03-27 NOTE — LETTER BODY
[Dear  ___] : Dear  [unfilled], [Courtesy Letter:] : I had the pleasure of seeing your patient, [unfilled], in my office today. [Please see my note below.] : Please see my note below. [Consult Closing:] : Thank you very much for allowing me to participate in the care of this patient.  If you have any questions, please do not hesitate to contact me. [FreeTextEntry3] : Best Regards,   Swati Arciniega MD

## 2024-04-03 LAB
BACTERIA UR CULT: ABNORMAL
HLX UV FISH FINAL REPORT: NORMAL
URINE CYTOLOGY: NORMAL

## 2024-07-31 NOTE — LETTER BODY
[Dear  ___] : Dear  [unfilled], [Courtesy Letter:] : I had the pleasure of seeing your patient, [unfilled], in my office today. [Please see my note below.] : Please see my note below. [Consult Closing:] : Thank you very much for allowing me to participate in the care of this patient.  If you have any questions, please do not hesitate to contact me. [FreeTextEntry3] : Best Regards, \par \par Swati Arciniega MD\par  Detail Level: Detailed Number Of Freeze-Thaw Cycles: 2 freeze-thaw cycles Show Aperture Variable?: Yes Render Note In Bullet Format When Appropriate: No Post-Care Instructions: I reviewed with the patient in detail post-care instructions. Patient is to wear sunprotection, and avoid picking at any of the treated lesions. Pt may apply Vaseline to crusted or scabbing areas. Aperture Size (Optional): C Consent: The patient's consent was obtained including but not limited to risks of crusting, scabbing, blistering, scarring, darker or lighter pigmentary change, recurrence, incomplete removal and infection. Duration Of Freeze Thaw-Cycle (Seconds): 5

## 2024-11-15 ENCOUNTER — APPOINTMENT (OUTPATIENT)
Dept: UROLOGY | Facility: CLINIC | Age: 74
End: 2024-11-15
Payer: MEDICARE

## 2024-11-15 VITALS
TEMPERATURE: 98 F | OXYGEN SATURATION: 98 % | DIASTOLIC BLOOD PRESSURE: 87 MMHG | HEART RATE: 88 BPM | SYSTOLIC BLOOD PRESSURE: 126 MMHG

## 2024-11-15 DIAGNOSIS — C67.9 MALIGNANT NEOPLASM OF BLADDER, UNSPECIFIED: ICD-10-CM

## 2024-11-15 PROCEDURE — 81003 URINALYSIS AUTO W/O SCOPE: CPT | Mod: QW

## 2024-11-15 PROCEDURE — 52000 CYSTOURETHROSCOPY: CPT

## 2024-11-19 ENCOUNTER — APPOINTMENT (OUTPATIENT)
Dept: OTOLARYNGOLOGY | Facility: CLINIC | Age: 74
End: 2024-11-19
Payer: MEDICARE

## 2024-11-19 DIAGNOSIS — Z96.21 COCHLEAR IMPLANT STATUS: ICD-10-CM

## 2024-11-19 DIAGNOSIS — M26.622 ARTHRALGIA OF LEFT TEMPOROMANDIBULAR JOINT: ICD-10-CM

## 2024-11-19 DIAGNOSIS — H90.3 SENSORINEURAL HEARING LOSS, BILATERAL: ICD-10-CM

## 2024-11-19 PROCEDURE — 92504 EAR MICROSCOPY EXAMINATION: CPT

## 2024-11-19 PROCEDURE — 92567 TYMPANOMETRY: CPT

## 2024-11-19 PROCEDURE — 99213 OFFICE O/P EST LOW 20 MIN: CPT

## 2024-11-19 PROCEDURE — 92557 COMPREHENSIVE HEARING TEST: CPT | Mod: 52

## 2024-11-19 PROCEDURE — 92504 EAR MICROSCOPY EXAMINATION: CPT | Mod: 59

## 2024-11-19 RX ORDER — FAMOTIDINE 10 MG/1
TABLET, FILM COATED ORAL
Refills: 0 | Status: ACTIVE | COMMUNITY

## 2024-11-22 ENCOUNTER — NON-APPOINTMENT (OUTPATIENT)
Age: 74
End: 2024-11-22

## 2024-11-22 LAB
BACTERIA UR CULT: NORMAL
HLX UV FISH FINAL REPORT: NORMAL
URINE CYTOLOGY: NORMAL

## 2025-02-21 NOTE — HISTORY OF PRESENT ILLNESS
[FreeTextEntry1] : 72 YO F seen  8/6/2021 as NPT. She comes for interval FU of a previously treated TCC bladder. She reports normal voiding with no dysuria or gross hematuria. No interval symptomatic UTIs.\par \par Patient seen 2/20/2018 for annual BTFU cystoscopy for low grade superficial TCC removed 7/1/2010. FISH negative at that time.\par \par UA trace RBCs, trace WBCs. Urine tests sent and cystoscopy scheduled.\par \par Patient seen  8/18/2021 for cystoscopy.\par urine C+S negative\par urine cytology negative\par FISH negative.\par Cystoscopy revealed recurrence left lateral wall multifocal TCC.\par PAtient underwent TURBT 9/3/2021 at [facility]. PAt superficial, non-invasive TCC low grade with 5% high grade. No muscularis propria noted. \par \par Patient seen  9/10/2021 for assessment of her post op course, review of pathology and plan further testing and treatment. She told me that she is voiding well with no gross hematuria but still has mild pelvic discomfort. \par \par Pathology report reviewed which showed non-invasive TCC predominantly low grade with 5% high grade. lamina propria not invaded, no muscularis propria identified, even on deep specimen.\par UA Nod RBC, MOd WBC, Nitrite negative. \par \par Patient underwent cystoscopy, second look and bladder biopsy of the base of the tumor on 11/3/2021, Tolerated well. Due to deep sampling, the Belle catheter was left indwelling. PATHOLOGY showed no evidence of recurrent cancer, including in muscularis propria.\par \par Patient seen  11/10/2021 for voiding trial which was successful. SHe has been having pain from the Belle catheter. She is presently on Rituxan for Immunosuppression for her R. A  through Dr. Maruice Palafox [phone]. \par UA RBC\[facility] \par Patient seen  12/20/2021 for urine check and to plan weekly BCG x 6. She continues to have mild dysuria and urethral stinging which has improved since surgery.\par UA large RBC, WBC, negative nitrites\par \par Patient seen  1/7/2022 for 1st of 6 planned BCG instillations. Urine C+S from 1/3/2022 negative.\par UA trace RBC, Large WBC\par \par Patient seen 1/13/2002 for 2/6 BCG instillations. She tolerated the 1 st dose without incident. She continues to have dysuria intermittently but is more concerned over her RA which keeps becoming more symptomatic.\par UA large WBC only.\par C+S positive >100 K Enterococcus S to all.\par We elected not to treat the urine culture but to continue with the BCG due to her desire to complete the course and return on her treatment for her R. A.\par \par Patient seen  1/21/2022 for BCG #3/6. Her bladder discomfort is unchanged and tolerable, but her R.A. continues to be severe and symptomatic. \par UA small RBC, small WBC only.\par C+S 50 - 99 K Enterococcus\par \par Patient seen  1/28/2022 for BCG # 4/6. She told me that she is tolerating the BCG well with no problems retaining it for 2 hours. Her LUTS have improved. She started on IV Methotrexate with her Rheumatologist this week for her very symptomatic Rheumatoid Arthritis.\par UA No RBC, Trace WBC, Nitrite negative.\par \par Patient seen 2/4/2022 for BCG # 5/6. She tolerated the last dose well with minimal dysuria on day 1. She continues to suffer from her R.A.\par UA negative. BCG #5/6 instilled today.\par C+S negative.\par \par Patient seen  2/11/2022 for BCG #6/6. She reports no problems with tolerating the BCG so far, but continues to suffer from the R.A. She is now on steroid medication from her pain management MD.\par UA negative\par C+S negative\par \par Patient seen TODAY 3/25/2022 for BTFU cystoscopy. She is having severe back symptoms from her R.A. No urinary issues. Cystoscopy today showed no recurrent TCC, 2 tiny areas of erythema (1 mm each) on the left posterior wall distinct from areas of prior resection.\par \par Patient remains under care for her HTN and Rheumatoid Arthritis and is now in IV Rx for the latter. She is allergic to Sulfa drugs.\par  The patient denies fevers, chills, nausea and or vomiting and no unexplained weight loss. \par All pertinent parts of the patient PFSH (past medical, family and social histories), laboratory, radiological studies and physician notes were reviewed prior to starting the face to face portion of the  visit. Questionnaire results were discussed with patient.\par  Never smoker

## 2025-05-30 ENCOUNTER — APPOINTMENT (OUTPATIENT)
Dept: UROLOGY | Facility: CLINIC | Age: 75
End: 2025-05-30
Payer: MEDICARE

## 2025-05-30 ENCOUNTER — NON-APPOINTMENT (OUTPATIENT)
Age: 75
End: 2025-05-30

## 2025-05-30 VITALS
DIASTOLIC BLOOD PRESSURE: 68 MMHG | TEMPERATURE: 97.8 F | HEART RATE: 78 BPM | SYSTOLIC BLOOD PRESSURE: 109 MMHG | OXYGEN SATURATION: 97 %

## 2025-05-30 DIAGNOSIS — C67.9 MALIGNANT NEOPLASM OF BLADDER, UNSPECIFIED: ICD-10-CM

## 2025-05-30 PROCEDURE — 52000 CYSTOURETHROSCOPY: CPT

## 2025-05-30 PROCEDURE — 81003 URINALYSIS AUTO W/O SCOPE: CPT | Mod: QW

## 2025-06-05 ENCOUNTER — NON-APPOINTMENT (OUTPATIENT)
Age: 75
End: 2025-06-05

## 2025-06-05 LAB
BACTERIA UR CULT: NORMAL
HLX UV FISH FINAL REPORT: NORMAL
URINE CYTOLOGY: NORMAL